# Patient Record
Sex: MALE | Race: WHITE | NOT HISPANIC OR LATINO | Employment: PART TIME | URBAN - METROPOLITAN AREA
[De-identification: names, ages, dates, MRNs, and addresses within clinical notes are randomized per-mention and may not be internally consistent; named-entity substitution may affect disease eponyms.]

---

## 2017-03-24 ENCOUNTER — OFFICE VISIT (OUTPATIENT)
Dept: FAMILY MEDICINE | Facility: CLINIC | Age: 47
End: 2017-03-24
Payer: COMMERCIAL

## 2017-03-24 VITALS
WEIGHT: 288 LBS | TEMPERATURE: 99.7 F | DIASTOLIC BLOOD PRESSURE: 98 MMHG | BODY MASS INDEX: 43.65 KG/M2 | HEIGHT: 68 IN | RESPIRATION RATE: 16 BRPM | HEART RATE: 81 BPM | SYSTOLIC BLOOD PRESSURE: 152 MMHG | OXYGEN SATURATION: 95 %

## 2017-03-24 DIAGNOSIS — R07.0 THROAT PAIN: Primary | ICD-10-CM

## 2017-03-24 DIAGNOSIS — I10 BENIGN ESSENTIAL HYPERTENSION: ICD-10-CM

## 2017-03-24 DIAGNOSIS — Z23 NEED FOR VACCINATION: ICD-10-CM

## 2017-03-24 LAB
DEPRECATED S PYO AG THROAT QL EIA: NORMAL
MICRO REPORT STATUS: NORMAL
SPECIMEN SOURCE: NORMAL

## 2017-03-24 PROCEDURE — 87081 CULTURE SCREEN ONLY: CPT | Performed by: NURSE PRACTITIONER

## 2017-03-24 PROCEDURE — 90715 TDAP VACCINE 7 YRS/> IM: CPT | Performed by: NURSE PRACTITIONER

## 2017-03-24 PROCEDURE — 90471 IMMUNIZATION ADMIN: CPT | Performed by: NURSE PRACTITIONER

## 2017-03-24 PROCEDURE — 87880 STREP A ASSAY W/OPTIC: CPT | Performed by: NURSE PRACTITIONER

## 2017-03-24 PROCEDURE — 99214 OFFICE O/P EST MOD 30 MIN: CPT | Mod: 25 | Performed by: NURSE PRACTITIONER

## 2017-03-24 RX ORDER — CHLORTHALIDONE 25 MG/1
25 TABLET ORAL DAILY
Qty: 30 TABLET | Refills: 3 | Status: SHIPPED | OUTPATIENT
Start: 2017-03-24 | End: 2018-10-01

## 2017-03-24 NOTE — MR AVS SNAPSHOT
After Visit Summary   3/24/2017    Arsenio Stockton    MRN: [id number]           Patient Information     Date Of Birth          1970        Visit Information        Provider Department      3/24/2017 9:40 AM Lata Cabral APRN [facility]        Today's Diagnoses     Throat pain    -  1    Benign essential hypertension          Care Instructions    Start medication for blood pressure daily.  Return in 2 weeks for recheck of pressure, come fasting to that appointment and we can do blood work.  Follow up if symptoms persist or worsen and as needed.                   Upper Respiratory Infection   (Common Cold)   Information About Your Condition:  Description  The common cold is an infection of the head and chest caused by a virus. It is a type of upper respiratory infection (URI). It can affect your nose, throat, sinuses, and ears. A cold can also affect your windpipe, voice box, and airways and the tube that connects your middle ear and throat.  Symptoms  You usually start having cold symptoms one to three days after contact with a cold virus. Symptoms may include one or more of the following:  scratchy or sore throat   sneezing, runny or stuffy nose   cough   watery eyes   ear congestion   slight fever (99 to 100  F, or 37.2 to 37.8  C)   tiredness   headache   loss of appetite.   Causes  Over 200 different viruses can cause colds. The infection spreads when viruses are passed to others by sneezing, coughing, or touching. You may also become infected by handling objects that were touched by someone with a cold.   You are more likely to get a cold if:   You are emotionally or physically stressed.   You are tired.   You are not eating enough healthy food.   You are a smoker.   You are living or working in crowded conditions.   People tend to get fewer colds as they get older because they build up immunity to some of the viruses that can cause colds.   What You Should Do At  Home (Follow-up Care)   There are no medicines that cure a cold. You can treat your symptoms with over-the-counter medicines such as aspirin, acetaminophen, ibuprofen, naproxen, nose drops or sprays, cough syrups and drops, throat lozenges, and decongestants. Check with your provider before you take any of these drugs if you are already taking other medicines.   Get lots of rest.   As long as your healthcare provider has not told you differently, drink plenty of liquids. An average adult should drink at least 6 to 10 eight-ounce glasses of liquids that do not contain alcohol (including beer or wine), such as water, juice, or weak tea each day. One way to tell if you are drinking enough liquid is to look at the color of your urine (pee). It should be very light yellow.   Using cool-mist humidifier to increase air moisture, especially in your bedroom, may make it easier to breathe. Be sure to clean the humidifier often so that it does not grow mold or bacteria.   Use nose drops to relieve nasal congestion. You can buy nose drops or make your own. To make a solution for nose drops, add one teaspoon of salt to a quart of water.   Wash your hands often with soap and warm water for at least 15 seconds to help keep your cold from spreading to others.   Avoid close contact with others for a few days.   Cover your nose and mouth with a tissue when you cough or sneeze. Throw the tissue away in the nearest waste receptacle. If you don t have a tissue, cough into the bend of your elbow.   If you smoke, stop. If someone else in your household smokes, ask them to smoke outside. Avoid exposure to secondhand smoke. Also avoid being outdoors during high-pollution advisories.   Please keep all medicines out of the reach of children.   What You Can Do Stay Healthy  Avoid close contact with people who have a cold.   Keep your hands away from your nose and mouth.   Wash your hands often with warm water and soap for at least 15 seconds,  especially during peak cold and flu season. You can also carry an alcohol-based hand  with you to clean your hands when soap and water aren t available.   Eat healthy foods, especially fruits with vitamin C, such as oranges.   Get 7 to 8 hours of sleep.   Do not smoke and avoid secondhand smoke or being outdoors during high-pollution alerts.   Keep your immunizations up to date. Get a pneumonia vaccine if you have a chronic illness or are 65 years of age or older. Get a flu shot every year in the fall.   Call Your Healthcare Provider Right Away Or Return To The Emergency Department If:  You have trouble breathing not caused by nasal stuffiness.   You are not able to swallow your saliva.   You have a cough that gets worse or becomes painful.   You are coughing up yellowish or greenish phlegm (mucus).   The phlegm you are coughing up has streaks of blood.   You have a temperature of 101.5  F (38.6  C) or higher that lasts more than two days.   You have shaking chills.   You have a headache that lasts several days.   You have bluish, pale, or grayish lips, skin, or nails.   You have any symptoms that worry you.        Thank you for choosing [facility].  You may be receiving a survey in the mail from [employer] regarding your visit today.  Please take a few minutes to complete and return the survey to let us know how we are doing.      Our Clinic hours are:  Mondays    7:20 am - 7 pm  Tues -  Fri  7:20 am - 5 pm    Clinic Phone: [phone]    The clinic lab opens at 7:30 am Mon - Fri and appointments are required.    [facility]. [phone]  Monday-Thursday 8 am - 7pm  Tues/Wed/Fri 8 am - 5:30 pm         Discharge Instructions for High Blood Pressure (Hypertension)  You have been diagnosed with high blood pressure (also called hypertension). This means the force of blood against your artery walls is too strong. It also means your heart is working hard to move blood. High blood  pressure usually has no symptoms, but over time, it can damage your heart, blood vessels, eyes, kidneys, and other organs. With help from your doctor, you can manage your blood pressure and protect your health.  Taking medications    Learn to take your own blood pressure. Keep a record of your results. Ask your doctor which readings mean that you need medical attention.    Take your blood pressure medication exactly as directed. Don t skip doses. Missing doses can cause your blood pressure to get out of control.    Avoid medications that contain heart stimulants, including over-the-counter drugs. Check for warnings about high blood pressure on the label.    Check with your doctor before taking a decongestant. Some decongestants can worsen high blood pressure.  Lifestyle changes    Maintain a healthy weight. Get help to lose any extra pounds.    Cut back on salt.    Limit canned, dried, packaged, and fast foods.    Don t add salt to your food at the table.    Season foods with herbs instead of salt when you cook.    Follow the DASH (Dietary Approaches to Stop Hypertension) eating plan. This plan recommends vegetables, fruits, whole gains, and other heart healthy foods.    Begin an exercise program. Ask your doctor how to get started. The American Heart Association recommends aerobic exercise 3 to 4 times a week for an average of 40 minutes at a time, with your doctor's approval. Simple activities like walking or gardening can help.    Break the smoking habit. Enroll in a stop-smoking program to improve your chances of success. Ask your health care provider about programs and medications to help you stop smoking.    Limit drinks that contain caffeine (coffee, black or green tea, cola) to 2 per day.    Never take stimulants such as amphetamines or cocaine; these drugs can be deadly for someone with high blood pressure.    Control your stress. Learn stress-management techniques.    Limit alcohol to no more than 1 drink  "a day for women and 2 drinks a day for men.  Follow-up care  Make a follow-up appointment as directed by our staff.     When to seek medical care  Call your doctor immediately if you have any of the following:    Chest pain or shortness of breath (call 911)    Moderate to severe headache    Weakness in the muscles of your face, arms, or legs    Trouble speaking    Extreme drowsiness    Confusion    Fainting or dizziness    Pulsating or rushing sound in your ears    Unexplained nosebleed    Weakness, tingling, or numbness of your face, arms, or legs    Change in vision    Blood pressure measured at home that is greater than 180/110     [id number] [employer]. [address] [place] [address]. All rights reserved. This information is not intended as a substitute for professional medical care. Always follow your healthcare professional's instructions.              Follow-ups after your visit        Who to contact     If you have questions or need follow up information about today's clinic visit or your schedule please contact [facility] directly at [phone].  Normal or non-critical lab and imaging results will be communicated to you by MyChart, letter or phone within 4 business days after the clinic has received the results. If you do not hear from us within 7 days, please contact the clinic through [employer] or phone. If you have a critical or abnormal lab result, we will notify you by phone as soon as possible.  Submit refill requests through [employer] or call your pharmacy and they will forward the refill request to us. Please allow 3 business days for your refill to be completed.          Additional Information About Your Visit        MyChart Information     [employer] lets you send messages to your doctor, view your test results, renew your prescriptions, schedule appointments and more. To sign up, go to [email or web address] . Click on \"Log in\" on the left side of the " "screen, which will take you to the Welcome page. Then click on \"Sign up Now\" on the right side of the page.     You will be asked to enter the access code listed below, as well as some personal information. Please follow the directions to create your username and password.     Your access code is: [email or web address]  Expires: 2017 10:11 AM     Your access code will  in 90 days. If you need help or a new code, please call your [facility] or [phone].        Care EveryWhere ID     This is your Care EveryWhere ID. This could be used by other organizations to access your [facility] medical records  [id number]        Your Vitals Were     Pulse Temperature Respirations Height Pulse Oximetry BMI (Body Mass Index)    81 99.7  F (37.6  C) (Oral) 16 5' 8.25\" (1.734 m) 95% 43.47 kg/m2       Blood Pressure from Last 3 Encounters:   17 (!) 152/98   16 (!) 182/102   03/06/15 (!) 130/94    Weight from Last 3 Encounters:   17 288 lb (130.6 kg)   16 285 lb (129.3 kg)   03/06/15 286 lb 12.8 oz (130.1 kg)              We Performed the Following     Beta strep group A culture     Rapid strep screen          Today's Medication Changes          These changes are accurate as of: 3/24/17 10:11 AM.  If you have any questions, ask your nurse or doctor.               Start taking these medicines.        Dose/Directions    chlorthalidone 25 MG tablet   Commonly known as:  HYGROTON   Used for:  Benign essential hypertension   Started by:  Lata Cabral APRN CNP        Dose:  25 mg   Take 1 tablet (25 mg) by mouth daily   Quantity:  30 tablet   Refills:  3            Where to get your medicines      [facility] - [address]  [address] [address]    Hours:  [place] Phone:  [phone]     chlorthalidone 25 MG tablet                Primary Care Provider    Clinic Unassigned Rambo Allison MD       " No address on file        Thank you!     Thank you for choosing [facility]  for your care. Our goal is always to provide you with excellent care. Hearing back from our patients is one way we can continue to improve our services. Please take a few minutes to complete the written survey that you may receive in the mail after your visit with us. Thank you!             Your Updated Medication List - Protect others around you: Learn how to safely use, store and throw away your medicines at www.disposemymeds.org.          This list is accurate as of: 3/24/17 10:11 AM.  Always use your most recent med list.                   Brand Name Dispense Instructions for use    chlorthalidone 25 MG tablet    HYGROTON    30 tablet    Take 1 tablet (25 mg) by mouth daily

## 2017-03-24 NOTE — LETTER
[facility]  [address]  [facility] [address]  [phone]  Dept: [phone]      3/24/2017    Re: Arsenio Stockton      TO WHOM IT MAY CONCERN:    Arsenio Stockton  was seen on 3/24/2017.  Please excuse him  until 3/28/2017 due to illness.    CordRODRICK Acosta CNP  [facility]

## 2017-03-24 NOTE — PROGRESS NOTES
SUBJECTIVE:                                                    Arsenio Stockton is a 46 year old male who presents to clinic today for the following health issues:      ENT Symptoms             Symptoms: cc Present Absent Comment   Fever/Chills  x     Fatigue  x     Muscle Aches  x     Eye Irritation  x     Sneezing  x     Nasal Zoran/Drg  x     Sinus Pressure/Pain  x     Loss of smell   x    Dental pain   x    Sore Throat x x     Swollen Glands  x     Ear Pain/Fullness   x    Cough  x     Wheeze  x     Chest Pain  x     Shortness of breath   x    Rash   x    Other  x  Tunnel vision and dizzy     Symptom duration:  started Wed evening   Symptom severity:  mod   Treatments tried:  last night took Nyquil and Dayquil   Contacts:  work             Problem list and histories reviewed & adjusted, as indicated.  Additional history: states he needs a note for missed work. His son has been sick as well.  He has been told his blood pressure has been elevated in the past.  He has never been treated for that. Denies any obvious symptoms, no palpitations, chest pains, headaches.  His dad had a stroke but he states he was a bad alcoholic. He doesn't know of anyone else in the family with hypertension.  He does chew snuff and drinks caffeine daily. Admits to gaining some weight and not being as active as he should be.      Patient Active Problem List   Diagnosis     CARDIOVASCULAR SCREENING; LDL GOAL LESS THAN 160     Elevated blood pressure reading without diagnosis of hypertension     History reviewed. No pertinent surgical history.    Social History   Substance Use Topics     Smoking status: Former Smoker     Years: 3.00     Types: Dip, chew, snus or snuff     Smokeless tobacco: Former User     Types: Chew     Quit date: 1/8/2014     Alcohol use Yes      Comment: occ     Family History   Problem Relation Age of Onset     Unknown/Adopted Maternal Grandmother      Unknown/Adopted Maternal Grandfather          Current Outpatient  "Prescriptions   Medication Sig Dispense Refill     chlorthalidone (HYGROTON) 25 MG tablet Take 1 tablet (25 mg) by mouth daily 30 tablet 3     No Known Allergies    Reviewed and updated as needed this visit by clinical staff  Tobacco  Allergies  Med Hx  Surg Hx  Fam Hx  Soc Hx      Reviewed and updated as needed this visit by Provider          ROS: 10 point ROS neg other than the symptoms noted above in the HPI.    OBJECTIVE:                                                    BP (!) 152/98  Pulse 81  Temp 99.7  F (37.6  C) (Oral)  Resp 16  Ht 5' 8.25\" (1.734 m)  Wt 288 lb (130.6 kg)  SpO2 95%  BMI 43.47 kg/m2  Body mass index is 43.47 kg/(m^2).  GENERAL: healthy, alert and no distress  HENT: ear canals and TM's normal, pharynx with mild erythema  NECK: no adenopathy, no asymmetry  RESP: lungs clear to auscultation - no rales, rhonchi or wheezes  CV: regular rate and rhythm, normal S1 S2, no S3 or S4, no murmur  ABDOMEN: soft, nontender, no hepatosplenomegaly, no masses and bowel sounds normal  MS: no gross musculoskeletal defects noted      Diagnostic Test Results:  Results for orders placed or performed in visit on 03/24/17 (from the past 24 hour(s))   Rapid strep screen   Result Value Ref Range    Specimen Description Throat     Rapid Strep A Screen       NEGATIVE: No Group A streptococcal antigen detected by immunoassay, await   culture report.      Micro Report Status FINAL 03/24/2017         ASSESSMENT/PLAN:                                                            1. Throat pain    - Rapid strep screen  - Beta strep group A culture    2. Benign essential hypertension    - chlorthalidone (HYGROTON) 25 MG tablet; Take 1 tablet (25 mg) by mouth daily  Dispense: 30 tablet; Refill: 3  Discussed how to take the medication(s), expected outcomes, potential side effects.  Reviewed risks versus benefits of starting medications. Reviewed lifestyle modifications. He is willing to start medications at this " time.Will do labs when he returns for recheck.    3. Need for vaccination    - TDAP VACCINE (ADACEL) [54412.002]  - 1st  Administration  [99193]    See Patient Instructions  Patient Instructions     Start medication for blood pressure daily.  Return in 2 weeks for recheck of pressure, come fasting to that appointment and we can do blood work.  Follow up if symptoms persist or worsen and as needed.                   Upper Respiratory Infection   (Common Cold)   Information About Your Condition:  Description  The common cold is an infection of the head and chest caused by a virus. It is a type of upper respiratory infection (URI). It can affect your nose, throat, sinuses, and ears. A cold can also affect your windpipe, voice box, and airways and the tube that connects your middle ear and throat.  Symptoms  You usually start having cold symptoms one to three days after contact with a cold virus. Symptoms may include one or more of the following:  scratchy or sore throat   sneezing, runny or stuffy nose   cough   watery eyes   ear congestion   slight fever (99 to 100  F, or 37.2 to 37.8  C)   tiredness   headache   loss of appetite.   Causes  Over 200 different viruses can cause colds. The infection spreads when viruses are passed to others by sneezing, coughing, or touching. You may also become infected by handling objects that were touched by someone with a cold.   You are more likely to get a cold if:   You are emotionally or physically stressed.   You are tired.   You are not eating enough healthy food.   You are a smoker.   You are living or working in crowded conditions.   People tend to get fewer colds as they get older because they build up immunity to some of the viruses that can cause colds.   What You Should Do At Home (Follow-up Care)   There are no medicines that cure a cold. You can treat your symptoms with over-the-counter medicines such as aspirin, acetaminophen, ibuprofen, naproxen, nose drops or  sprays, cough syrups and drops, throat lozenges, and decongestants. Check with your provider before you take any of these drugs if you are already taking other medicines.   Get lots of rest.   As long as your healthcare provider has not told you differently, drink plenty of liquids. An average adult should drink at least 6 to 10 eight-ounce glasses of liquids that do not contain alcohol (including beer or wine), such as water, juice, or weak tea each day. One way to tell if you are drinking enough liquid is to look at the color of your urine (pee). It should be very light yellow.   Using cool-mist humidifier to increase air moisture, especially in your bedroom, may make it easier to breathe. Be sure to clean the humidifier often so that it does not grow mold or bacteria.   Use nose drops to relieve nasal congestion. You can buy nose drops or make your own. To make a solution for nose drops, add one teaspoon of salt to a quart of water.   Wash your hands often with soap and warm water for at least 15 seconds to help keep your cold from spreading to others.   Avoid close contact with others for a few days.   Cover your nose and mouth with a tissue when you cough or sneeze. Throw the tissue away in the nearest waste receptacle. If you don t have a tissue, cough into the bend of your elbow.   If you smoke, stop. If someone else in your household smokes, ask them to smoke outside. Avoid exposure to secondhand smoke. Also avoid being outdoors during high-pollution advisories.   Please keep all medicines out of the reach of children.   What You Can Do Stay Healthy  Avoid close contact with people who have a cold.   Keep your hands away from your nose and mouth.   Wash your hands often with warm water and soap for at least 15 seconds, especially during peak cold and flu season. You can also carry an alcohol-based hand  with you to clean your hands when soap and water aren t available.   Eat healthy foods, especially  fruits with vitamin C, such as oranges.   Get 7 to 8 hours of sleep.   Do not smoke and avoid secondhand smoke or being outdoors during high-pollution alerts.   Keep your immunizations up to date. Get a pneumonia vaccine if you have a chronic illness or are 65 years of age or older. Get a flu shot every year in the fall.   Call Your Healthcare Provider Right Away Or Return To The Emergency Department If:  You have trouble breathing not caused by nasal stuffiness.   You are not able to swallow your saliva.   You have a cough that gets worse or becomes painful.   You are coughing up yellowish or greenish phlegm (mucus).   The phlegm you are coughing up has streaks of blood.   You have a temperature of 101.5  F (38.6  C) or higher that lasts more than two days.   You have shaking chills.   You have a headache that lasts several days.   You have bluish, pale, or grayish lips, skin, or nails.   You have any symptoms that worry you.        Thank you for choosing [facility].  You may be receiving a survey in the mail from [employer] regarding your visit today.  Please take a few minutes to complete and return the survey to let us know how we are doing.      Our Clinic hours are:  Mondays    7:20 am - 7 pm  Tues -  Fri  7:20 am - 5 pm    Clinic Phone: [phone]    The clinic lab opens at 7:30 am Mon - Fri and appointments are required.    [facility]. [phone]  Monday-Thursday 8 am - 7pm  Tues/Wed/Fri 8 am - 5:30 pm         Discharge Instructions for High Blood Pressure (Hypertension)  You have been diagnosed with high blood pressure (also called hypertension). This means the force of blood against your artery walls is too strong. It also means your heart is working hard to move blood. High blood pressure usually has no symptoms, but over time, it can damage your heart, blood vessels, eyes, kidneys, and other organs. With help from your doctor, you can manage your blood pressure and  protect your health.  Taking medications    Learn to take your own blood pressure. Keep a record of your results. Ask your doctor which readings mean that you need medical attention.    Take your blood pressure medication exactly as directed. Don t skip doses. Missing doses can cause your blood pressure to get out of control.    Avoid medications that contain heart stimulants, including over-the-counter drugs. Check for warnings about high blood pressure on the label.    Check with your doctor before taking a decongestant. Some decongestants can worsen high blood pressure.  Lifestyle changes    Maintain a healthy weight. Get help to lose any extra pounds.    Cut back on salt.    Limit canned, dried, packaged, and fast foods.    Don t add salt to your food at the table.    Season foods with herbs instead of salt when you cook.    Follow the DASH (Dietary Approaches to Stop Hypertension) eating plan. This plan recommends vegetables, fruits, whole gains, and other heart healthy foods.    Begin an exercise program. Ask your doctor how to get started. The American Heart Association recommends aerobic exercise 3 to 4 times a week for an average of 40 minutes at a time, with your doctor's approval. Simple activities like walking or gardening can help.    Break the smoking habit. Enroll in a stop-smoking program to improve your chances of success. Ask your health care provider about programs and medications to help you stop smoking.    Limit drinks that contain caffeine (coffee, black or green tea, cola) to 2 per day.    Never take stimulants such as amphetamines or cocaine; these drugs can be deadly for someone with high blood pressure.    Control your stress. Learn stress-management techniques.    Limit alcohol to no more than 1 drink a day for women and 2 drinks a day for men.  Follow-up care  Make a follow-up appointment as directed by our staff.     When to seek medical care  Call your doctor immediately if you have  any of the following:    Chest pain or shortness of breath (call 911)    Moderate to severe headache    Weakness in the muscles of your face, arms, or legs    Trouble speaking    Extreme drowsiness    Confusion    Fainting or dizziness    Pulsating or rushing sound in your ears    Unexplained nosebleed    Weakness, tingling, or numbness of your face, arms, or legs    Change in vision    Blood pressure measured at home that is greater than 180/110     6718-9373 [employer] [address] [place] [address]. All rights reserved. This information is not intended as a substitute for professional medical care. Always follow your healthcare professional's instructions.            RODRICK Bustillos [facility]

## 2017-03-24 NOTE — NURSING NOTE
"Chief Complaint   Patient presents with     URI       Initial BP (!) 168/98 (BP Location: Right arm, Patient Position: Chair, Cuff Size: Adult Large)  Pulse 81  Temp 99.7  F (37.6  C) (Oral)  Resp 16  Ht 5' 8.25\" (1.734 m)  Wt 288 lb (130.6 kg)  SpO2 95%  BMI 43.47 kg/m2 Estimated body mass index is 43.47 kg/(m^2) as calculated from the following:    Height as of this encounter: 5' 8.25\" (1.734 m).    Weight as of this encounter: 288 lb (130.6 kg).  Medication Reconciliation: complete  "

## 2017-03-24 NOTE — PATIENT INSTRUCTIONS
Start medication for blood pressure daily.  Return in 2 weeks for recheck of pressure, come fasting to that appointment and we can do blood work.  Follow up if symptoms persist or worsen and as needed.                   Upper Respiratory Infection   (Common Cold)   Information About Your Condition:  Description  The common cold is an infection of the head and chest caused by a virus. It is a type of upper respiratory infection (URI). It can affect your nose, throat, sinuses, and ears. A cold can also affect your windpipe, voice box, and airways and the tube that connects your middle ear and throat.  Symptoms  You usually start having cold symptoms one to three days after contact with a cold virus. Symptoms may include one or more of the following:  scratchy or sore throat   sneezing, runny or stuffy nose   cough   watery eyes   ear congestion   slight fever (99 to 100  F, or 37.2 to 37.8  C)   tiredness   headache   loss of appetite.   Causes  Over 200 different viruses can cause colds. The infection spreads when viruses are passed to others by sneezing, coughing, or touching. You may also become infected by handling objects that were touched by someone with a cold.   You are more likely to get a cold if:   You are emotionally or physically stressed.   You are tired.   You are not eating enough healthy food.   You are a smoker.   You are living or working in crowded conditions.   People tend to get fewer colds as they get older because they build up immunity to some of the viruses that can cause colds.   What You Should Do At Home (Follow-up Care)   There are no medicines that cure a cold. You can treat your symptoms with over-the-counter medicines such as aspirin, acetaminophen, ibuprofen, naproxen, nose drops or sprays, cough syrups and drops, throat lozenges, and decongestants. Check with your provider before you take any of these drugs if you are already taking other medicines.   Get lots of rest.   As long as  your healthcare provider has not told you differently, drink plenty of liquids. An average adult should drink at least 6 to 10 eight-ounce glasses of liquids that do not contain alcohol (including beer or wine), such as water, juice, or weak tea each day. One way to tell if you are drinking enough liquid is to look at the color of your urine (pee). It should be very light yellow.   Using cool-mist humidifier to increase air moisture, especially in your bedroom, may make it easier to breathe. Be sure to clean the humidifier often so that it does not grow mold or bacteria.   Use nose drops to relieve nasal congestion. You can buy nose drops or make your own. To make a solution for nose drops, add one teaspoon of salt to a quart of water.   Wash your hands often with soap and warm water for at least 15 seconds to help keep your cold from spreading to others.   Avoid close contact with others for a few days.   Cover your nose and mouth with a tissue when you cough or sneeze. Throw the tissue away in the nearest waste receptacle. If you don t have a tissue, cough into the bend of your elbow.   If you smoke, stop. If someone else in your household smokes, ask them to smoke outside. Avoid exposure to secondhand smoke. Also avoid being outdoors during high-pollution advisories.   Please keep all medicines out of the reach of children.   What You Can Do Stay Healthy  Avoid close contact with people who have a cold.   Keep your hands away from your nose and mouth.   Wash your hands often with warm water and soap for at least 15 seconds, especially during peak cold and flu season. You can also carry an alcohol-based hand  with you to clean your hands when soap and water aren t available.   Eat healthy foods, especially fruits with vitamin C, such as oranges.   Get 7 to 8 hours of sleep.   Do not smoke and avoid secondhand smoke or being outdoors during high-pollution alerts.   Keep your immunizations up to date. Get a  pneumonia vaccine if you have a chronic illness or are 65 years of age or older. Get a flu shot every year in the fall.   Call Your Healthcare Provider Right Away Or Return To The Emergency Department If:  You have trouble breathing not caused by nasal stuffiness.   You are not able to swallow your saliva.   You have a cough that gets worse or becomes painful.   You are coughing up yellowish or greenish phlegm (mucus).   The phlegm you are coughing up has streaks of blood.   You have a temperature of 101.5  F (38.6  C) or higher that lasts more than two days.   You have shaking chills.   You have a headache that lasts several days.   You have bluish, pale, or grayish lips, skin, or nails.   You have any symptoms that worry you.        Thank you for choosing [facility].  You may be receiving a survey in the mail from [employer] regarding your visit today.  Please take a few minutes to complete and return the survey to let us know how we are doing.      Our Clinic hours are:  Mondays    7:20 am - 7 pm  Tues -  Fri  7:20 am - 5 pm    Clinic Phone: [phone]    The clinic lab opens at 7:30 am Mon - Fri and appointments are required.    [facility]. [phone]  Monday-Thursday 8 am - 7pm  Tues/Wed/Fri 8 am - 5:30 pm         Discharge Instructions for High Blood Pressure (Hypertension)  You have been diagnosed with high blood pressure (also called hypertension). This means the force of blood against your artery walls is too strong. It also means your heart is working hard to move blood. High blood pressure usually has no symptoms, but over time, it can damage your heart, blood vessels, eyes, kidneys, and other organs. With help from your doctor, you can manage your blood pressure and protect your health.  Taking medications    Learn to take your own blood pressure. Keep a record of your results. Ask your doctor which readings mean that you need medical attention.    Take your blood  pressure medication exactly as directed. Don t skip doses. Missing doses can cause your blood pressure to get out of control.    Avoid medications that contain heart stimulants, including over-the-counter drugs. Check for warnings about high blood pressure on the label.    Check with your doctor before taking a decongestant. Some decongestants can worsen high blood pressure.  Lifestyle changes    Maintain a healthy weight. Get help to lose any extra pounds.    Cut back on salt.    Limit canned, dried, packaged, and fast foods.    Don t add salt to your food at the table.    Season foods with herbs instead of salt when you cook.    Follow the DASH (Dietary Approaches to Stop Hypertension) eating plan. This plan recommends vegetables, fruits, whole gains, and other heart healthy foods.    Begin an exercise program. Ask your doctor how to get started. The American Heart Association recommends aerobic exercise 3 to 4 times a week for an average of 40 minutes at a time, with your doctor's approval. Simple activities like walking or gardening can help.    Break the smoking habit. Enroll in a stop-smoking program to improve your chances of success. Ask your health care provider about programs and medications to help you stop smoking.    Limit drinks that contain caffeine (coffee, black or green tea, cola) to 2 per day.    Never take stimulants such as amphetamines or cocaine; these drugs can be deadly for someone with high blood pressure.    Control your stress. Learn stress-management techniques.    Limit alcohol to no more than 1 drink a day for women and 2 drinks a day for men.  Follow-up care  Make a follow-up appointment as directed by our staff.     When to seek medical care  Call your doctor immediately if you have any of the following:    Chest pain or shortness of breath (call 911)    Moderate to severe headache    Weakness in the muscles of your face, arms, or legs    Trouble speaking    Extreme  drowsiness    Confusion    Fainting or dizziness    Pulsating or rushing sound in your ears    Unexplained nosebleed    Weakness, tingling, or numbness of your face, arms, or legs    Change in vision    Blood pressure measured at home that is greater than 180/110     8747-7983 [employer]. [address] [place] [address]. All rights reserved. This information is not intended as a substitute for professional medical care. Always follow your healthcare professional's instructions.

## 2017-03-24 NOTE — LETTER
[facility]  [address]  [facility] [address]  Phone: [phone]    March 28, 2017    Arsenio Mahin  [address] [address] [address]              Dear Mr. Stockton,      The results of your 24 hour throat culture were negative. Please contact your clinic if you have any questions or concerns.              Sincerely,      Lata Cabral NP/ [facility] [place]

## 2017-03-26 LAB
BACTERIA SPEC CULT: NORMAL
MICRO REPORT STATUS: NORMAL
SPECIMEN SOURCE: NORMAL

## 2017-03-27 PROBLEM — I10 BENIGN ESSENTIAL HYPERTENSION: Status: ACTIVE | Noted: 2017-03-27

## 2017-04-10 ENCOUNTER — OFFICE VISIT (OUTPATIENT)
Dept: FAMILY MEDICINE | Facility: CLINIC | Age: 47
End: 2017-04-10
Payer: COMMERCIAL

## 2017-04-10 VITALS
SYSTOLIC BLOOD PRESSURE: 130 MMHG | BODY MASS INDEX: 43.65 KG/M2 | HEART RATE: 85 BPM | WEIGHT: 288 LBS | TEMPERATURE: 98.4 F | RESPIRATION RATE: 16 BRPM | HEIGHT: 68 IN | DIASTOLIC BLOOD PRESSURE: 89 MMHG

## 2017-04-10 DIAGNOSIS — I10 BENIGN ESSENTIAL HYPERTENSION: Primary | ICD-10-CM

## 2017-04-10 DIAGNOSIS — J31.2 CHRONIC PHARYNGITIS: ICD-10-CM

## 2017-04-10 LAB
BASOPHILS # BLD AUTO: 0 10E9/L (ref 0–0.2)
BASOPHILS NFR BLD AUTO: 0.4 %
DIFFERENTIAL METHOD BLD: NORMAL
EOSINOPHIL # BLD AUTO: 0.2 10E9/L (ref 0–0.7)
EOSINOPHIL NFR BLD AUTO: 2.5 %
ERYTHROCYTE [DISTWIDTH] IN BLOOD BY AUTOMATED COUNT: 14.5 % (ref 10–15)
HCT VFR BLD AUTO: 43.9 % (ref 40–53)
HGB BLD-MCNC: 15.5 G/DL (ref 13.3–17.7)
LYMPHOCYTES # BLD AUTO: 2.9 10E9/L (ref 0.8–5.3)
LYMPHOCYTES NFR BLD AUTO: 30.7 %
MCH RBC QN AUTO: 28.7 PG (ref 26.5–33)
MCHC RBC AUTO-ENTMCNC: 35.3 G/DL (ref 31.5–36.5)
MCV RBC AUTO: 81 FL (ref 78–100)
MONOCYTES # BLD AUTO: 0.8 10E9/L (ref 0–1.3)
MONOCYTES NFR BLD AUTO: 8.4 %
NEUTROPHILS # BLD AUTO: 5.4 10E9/L (ref 1.6–8.3)
NEUTROPHILS NFR BLD AUTO: 58 %
PLATELET # BLD AUTO: 243 10E9/L (ref 150–450)
RBC # BLD AUTO: 5.41 10E12/L (ref 4.4–5.9)
WBC # BLD AUTO: 9.3 10E9/L (ref 4–11)

## 2017-04-10 PROCEDURE — 85025 COMPLETE CBC W/AUTO DIFF WBC: CPT | Performed by: NURSE PRACTITIONER

## 2017-04-10 PROCEDURE — 84443 ASSAY THYROID STIM HORMONE: CPT | Performed by: NURSE PRACTITIONER

## 2017-04-10 PROCEDURE — 84439 ASSAY OF FREE THYROXINE: CPT | Performed by: NURSE PRACTITIONER

## 2017-04-10 PROCEDURE — 36415 COLL VENOUS BLD VENIPUNCTURE: CPT | Performed by: NURSE PRACTITIONER

## 2017-04-10 PROCEDURE — 80053 COMPREHEN METABOLIC PANEL: CPT | Performed by: NURSE PRACTITIONER

## 2017-04-10 PROCEDURE — 99214 OFFICE O/P EST MOD 30 MIN: CPT | Performed by: NURSE PRACTITIONER

## 2017-04-10 RX ORDER — CETIRIZINE HYDROCHLORIDE 10 MG/1
10 TABLET ORAL DAILY
COMMUNITY
End: 2018-10-05

## 2017-04-10 NOTE — PROGRESS NOTES
"  SUBJECTIVE:                                                    Arsenio Stockton is a 46 year old male who presents to clinic today for the following health issues:      Hypertension Follow-up      Outpatient blood pressures are not being checked.    Low Salt Diet: not monitoring salt       Amount of exercise or physical activity: None    Problems taking medications regularly: No    Medication side effects: none    Diet: regular (no restrictions)          Problem list and histories reviewed & adjusted, as indicated.  Additional history: states he has no side effects from medication and is feeling fine. Blood pressure is down today.  He states he still has a sore throat or feeling like there's something in the right side of throat. He's on zyrtec daily and has been taking that for an itchy rash.  He's also been on Prilosec daily for heart burn issues, that's been fine. No other specific URI complaint.   He states he wakes at night gasping for air at times. His brother had a sleep study and reports feeling \"so much better\" after being treated for sleep apnea.  He's willing to do that at this time.  He has tried to decrease energy drinks. Continues to struggle with weight.    Patient Active Problem List   Diagnosis     CARDIOVASCULAR SCREENING; LDL GOAL LESS THAN 160     Elevated blood pressure reading without diagnosis of hypertension     Benign essential hypertension     History reviewed. No pertinent surgical history.    Social History   Substance Use Topics     Smoking status: Former Smoker     Years: 3.00     Types: Dip, chew, snus or snuff     Smokeless tobacco: Former User     Types: Chew     Quit date: 1/8/2014     Alcohol use Yes      Comment: occ     Family History   Problem Relation Age of Onset     Unknown/Adopted Maternal Grandmother      Unknown/Adopted Maternal Grandfather          Current Outpatient Prescriptions   Medication Sig Dispense Refill     OMEPRAZOLE PO Take 20 mg by mouth every morning       " "cetirizine (ZYRTEC) 10 MG tablet Take 10 mg by mouth daily       chlorthalidone (HYGROTON) 25 MG tablet Take 1 tablet (25 mg) by mouth daily 30 tablet 3     No Known Allergies  BP Readings from Last 3 Encounters:   04/10/17 130/89   03/24/17 (!) 152/98   02/29/16 (!) 182/102    Wt Readings from Last 3 Encounters:   04/10/17 288 lb (130.6 kg)   03/24/17 288 lb (130.6 kg)   02/29/16 285 lb (129.3 kg)                    Reviewed and updated as needed this visit by clinical staff  Tobacco  Allergies  Med Hx  Surg Hx  Fam Hx  Soc Hx      Reviewed and updated as needed this visit by Provider          ROS: 10 point ROS neg other than the symptoms noted above in the HPI.    OBJECTIVE:                                                    /89  Pulse 85  Temp 98.4  F (36.9  C) (Oral)  Resp 16  Ht 5' 8.25\" (1.734 m)  Wt 288 lb (130.6 kg)  BMI 43.47 kg/m2  Body mass index is 43.47 kg/(m^2).  GENERAL: healthy, alert and no distress  HENT: ear canals and TM's normal, pharynx without erythema  NECK: no adenopathy, no asymmetry  RESP: lungs clear to auscultation - no rales, rhonchi or wheezes  CV: regular rate and rhythm, normal S1 S2, no S3 or S4, no murmur  ABDOMEN: soft, obese, nontender  MS: no gross musculoskeletal defects noted      Diagnostic Test Results:  Results for orders placed or performed in visit on 04/10/17 (from the past 24 hour(s))   CBC with platelets differential   Result Value Ref Range    WBC 9.3 4.0 - 11.0 10e9/L    RBC Count 5.41 4.4 - 5.9 10e12/L    Hemoglobin 15.5 13.3 - 17.7 g/dL    Hematocrit 43.9 40.0 - 53.0 %    MCV 81 78 - 100 fl    MCH 28.7 26.5 - 33.0 pg    MCHC 35.3 31.5 - 36.5 g/dL    RDW 14.5 10.0 - 15.0 %    Platelet Count 243 150 - 450 10e9/L    Diff Method Automated Method     % Neutrophils 58.0 %    % Lymphocytes 30.7 %    % Monocytes 8.4 %    % Eosinophils 2.5 %    % Basophils 0.4 %    Absolute Neutrophil 5.4 1.6 - 8.3 10e9/L    Absolute Lymphocytes 2.9 0.8 - 5.3 10e9/L    " Absolute Monocytes 0.8 0.0 - 1.3 10e9/L    Absolute Eosinophils 0.2 0.0 - 0.7 10e9/L    Absolute Basophils 0.0 0.0 - 0.2 10e9/L        ASSESSMENT/PLAN:                                                            1. Benign essential hypertension    - Comprehensive metabolic panel  - TSH with free T4 reflex  - SLEEP EVALUATION & MANAGEMENT REFERRAL - ADULT; Future    2. Chronic pharyngitis    - CBC with platelets differential    See Patient Instructions  Patient Instructions   Call to schedule with sleep medicine.  Continue same medication for blood pressure. Check readings outside of clinic with goal of consistently <140/90.  Stop decongestants.  May want to increase Prilosec to twice a day for a week or so and see if that will calm down your sore throat. If it doesn't then you may want to increase the Zyrtec to twice a day.  If no help with either of that, will send to ENT for further evaluation.  Will let you know results of blood work done today.  Follow up if symptoms persist or worsen and as needed.        RODRICK Bustillos [facility]

## 2017-04-10 NOTE — NURSING NOTE
"Chief Complaint   Patient presents with     Hypertension       Initial /87 (BP Location: Right arm, Patient Position: Chair, Cuff Size: Adult Large)  Pulse 85  Temp 98.4  F (36.9  C) (Oral)  Resp 16  Ht 5' 8.25\" (1.734 m)  Wt 288 lb (130.6 kg)  BMI 43.47 kg/m2 Estimated body mass index is 43.47 kg/(m^2) as calculated from the following:    Height as of this encounter: 5' 8.25\" (1.734 m).    Weight as of this encounter: 288 lb (130.6 kg).  Medication Reconciliation: complete  "

## 2017-04-10 NOTE — LETTER
[facility]  [address]  [place] [address]  Phone: [phone]      4/11/2017     Arsenio Stockton  [address] [place] [address]      Dear Arsenio:    Thank you for allowing me to participate in your care. Your recent test results were reviewed and listed below.      Your results are provided below for your review  Results for orders placed or performed in visit on 04/10/17   Comprehensive metabolic panel   Result Value Ref Range    Sodium 141 133 - 144 mmol/L    Potassium 3.1 (L) 3.4 - 5.3 mmol/L    Chloride 101 94 - 109 mmol/L    Carbon Dioxide 29 20 - 32 mmol/L    Anion Gap 11 3 - 14 mmol/L    Glucose 124 (H) 70 - 99 mg/dL    Urea Nitrogen 20 7 - 30 mg/dL    Creatinine 0.95 0.66 - 1.25 mg/dL    GFR Estimate 86 >60 mL/min/1.7m2    GFR Estimate If Black >90   GFR Calc   >60 mL/min/1.7m2    Calcium 8.5 8.5 - 10.1 mg/dL    Bilirubin Total 0.3 0.2 - 1.3 mg/dL    Albumin 3.8 3.4 - 5.0 g/dL    Protein Total 7.7 6.8 - 8.8 g/dL    Alkaline Phosphatase 90 40 - 150 U/L    ALT 46 0 - 70 U/L    AST 26 0 - 45 U/L   CBC with platelets differential   Result Value Ref Range    WBC 9.3 4.0 - 11.0 10e9/L    RBC Count 5.41 4.4 - 5.9 10e12/L    Hemoglobin 15.5 13.3 - 17.7 g/dL    Hematocrit 43.9 40.0 - 53.0 %    MCV 81 78 - 100 fl    MCH 28.7 26.5 - 33.0 pg    MCHC 35.3 31.5 - 36.5 g/dL    RDW 14.5 10.0 - 15.0 %    Platelet Count 243 150 - 450 10e9/L    Diff Method Automated Method     % Neutrophils 58.0 %    % Lymphocytes 30.7 %    % Monocytes 8.4 %    % Eosinophils 2.5 %    % Basophils 0.4 %    Absolute Neutrophil 5.4 1.6 - 8.3 10e9/L    Absolute Lymphocytes 2.9 0.8 - 5.3 10e9/L    Absolute Monocytes 0.8 0.0 - 1.3 10e9/L    Absolute Eosinophils 0.2 0.0 - 0.7 10e9/L    Absolute Basophils 0.0 0.0 - 0.2 10e9/L   TSH with free T4 reflex   Result Value Ref Range    TSH 0.39 (L) 0.40 - 4.00 mU/L   T4 free   Result Value Ref Range    T4 Free 0.94 0.76 - 1.46 ng/dL     Thyroid test  was low normal.  Your potassium was low. I want you to add potassium rich foods to your diet daily, such as bananas, White Beans, Coconut water (no sugar added), Pomegranate seeds and juice, Dried apricots, Wild caugh salmon, sweet potatoes, cooked spinach, Guy[place] squash and avocados.  Elevated blood sugar level.  I would like to repeat labs in 1 month to reassess your potassium and blood sugar. (basic and a1c, lab only). We can repeat thyroid in 3 months, I'd like to see you then to recheck blood pressure and discuss sleep study as hopefully you'll have that done within 3 months, any questions, let us know.  Blood counts were normal. Kidney and liver tests were normal.            Thank you for choosing [facility]. As a result, please continue with the treatment plan discussed in the office. Return as discussed or sooner if symptoms worsen or fail to improve. If you have any further questions or concerns, please do not hesitate to contact us.      Sincerely,        CANDY Mckenzie

## 2017-04-10 NOTE — PATIENT INSTRUCTIONS
Call to schedule with sleep medicine.  Continue same medication for blood pressure. Check readings outside of clinic with goal of consistently <140/90.  Stop decongestants.  May want to increase Prilosec to twice a day for a week or so and see if that will calm down your sore throat. If it doesn't then you may want to increase the Zyrtec to twice a day.  If no help with either of that, will send to ENT for further evaluation.  Will let you know results of blood work done today.  Follow up if symptoms persist or worsen and as needed.

## 2017-04-10 NOTE — MR AVS SNAPSHOT
After Visit Summary   4/10/2017    Arsenio Stockton    MRN: [id number]           Patient Information     Date Of Birth          1970        Visit Information        Provider Department      4/10/2017 2:00 PM Lata Cabral APRN CNP [facility]        Today's Diagnoses     Benign essential hypertension    -  1    Chronic pharyngitis          Care Instructions    Call to schedule with sleep medicine.  Continue same medication for blood pressure. Check readings outside of clinic with goal of consistently <140/90.  Stop decongestants.  May want to increase Prilosec to twice a day for a week or so and see if that will calm down your sore throat. If it doesn't then you may want to increase the Zyrtec to twice a day.  If no help with either of that, will send to ENT for further evaluation.  Will let you know results of blood work done today.  Follow up if symptoms persist or worsen and as needed.          Follow-ups after your visit        Additional Services     SLEEP EVALUATION & MANAGEMENT REFERRAL - ADULT       Please be aware that coverage of these services is subject to the terms and limitations of your health insurance plan.  Call member services at your health plan with any benefit or coverage questions.      Please bring the following to your appointment:    >>   List of current medications   >>   This referral request   >>   Any documents/labs given to you for this referral    [facility] / Lab  Ph [phone] (Age 2 and up)                  Future tests that were ordered for you today     Open Future Orders        Priority Expected Expires Ordered    SLEEP EVALUATION & MANAGEMENT REFERRAL - ADULT Routine  4/10/2018 4/10/2017            Who to contact     If you have questions or need follow up information about today's clinic visit or your schedule please contact [facility] directly at [phone].  Normal or non-critical lab and  "imaging results will be communicated to you by MyChart, letter or phone within 4 business days after the clinic has received the results. If you do not hear from us within 7 days, please contact the clinic through [employer] or phone. If you have a critical or abnormal lab result, we will notify you by phone as soon as possible.  Submit refill requests through [employer] or call your pharmacy and they will forward the refill request to us. Please allow 3 business days for your refill to be completed.          Additional Information About Your Visit        Entredaharclipkit Information     Vatler lets you send messages to your doctor, view your test results, renew your prescriptions, schedule appointments and more. To sign up, go to [email or web address] . Click on \"Log in\" on the left side of the screen, which will take you to the Welcome page. Then click on \"Sign up Now\" on the right side of the page.     You will be asked to enter the access code listed below, as well as some personal information. Please follow the directions to create your username and password.     Your access code is: [email or web address]  Expires: 2017 10:11 AM     Your access code will  in 90 days. If you need help or a new code, please call your [facility] clinic or [phone].        Care EveryWhere ID     This is your Care EveryWhere ID. This could be used by other organizations to access your [facility] medical records  [id number]        Your Vitals Were     Pulse Temperature Respirations Height BMI (Body Mass Index)       85 98.4  F (36.9  C) (Oral) 16 5' 8.25\" (1.734 m) 43.47 kg/m2        Blood Pressure from Last 3 Encounters:   04/10/17 130/89   17 (!) 152/98   16 (!) 182/102    Weight from Last 3 Encounters:   04/10/17 288 lb (130.6 kg)   17 288 lb (130.6 kg)   16 285 lb (129.3 kg)              We Performed the Following     CBC with platelets differential     Comprehensive metabolic panel     TSH with free T4 reflex     "    Primary Care Provider Office Phone # Fax #    RODRICK Bustillos [phone]       [facility] [address]  [facility] [address]        Thank you!     Thank you for choosing [facility]  for your care. Our goal is always to provide you with excellent care. Hearing back from our patients is one way we can continue to improve our services. Please take a few minutes to complete the written survey that you may receive in the mail after your visit with us. Thank you!             Your Updated Medication List - Protect others around you: Learn how to safely use, store and throw away your medicines at www.disposemymeds.org.          This list is accurate as of: 4/10/17  2:35 PM.  Always use your most recent med list.                   Brand Name Dispense Instructions for use    cetirizine 10 MG tablet    zyrTEC     Take 10 mg by mouth daily       chlorthalidone 25 MG tablet    HYGROTON    30 tablet    Take 1 tablet (25 mg) by mouth daily       OMEPRAZOLE PO      Take 20 mg by mouth every morning

## 2017-04-11 LAB
ALBUMIN SERPL-MCNC: 3.8 G/DL (ref 3.4–5)
ALP SERPL-CCNC: 90 U/L (ref 40–150)
ALT SERPL W P-5'-P-CCNC: 46 U/L (ref 0–70)
ANION GAP SERPL CALCULATED.3IONS-SCNC: 11 MMOL/L (ref 3–14)
AST SERPL W P-5'-P-CCNC: 26 U/L (ref 0–45)
BILIRUB SERPL-MCNC: 0.3 MG/DL (ref 0.2–1.3)
BUN SERPL-MCNC: 20 MG/DL (ref 7–30)
CALCIUM SERPL-MCNC: 8.5 MG/DL (ref 8.5–10.1)
CHLORIDE SERPL-SCNC: 101 MMOL/L (ref 94–109)
CO2 SERPL-SCNC: 29 MMOL/L (ref 20–32)
CREAT SERPL-MCNC: 0.95 MG/DL (ref 0.66–1.25)
GFR SERPL CREATININE-BSD FRML MDRD: 86 ML/MIN/1.7M2
GLUCOSE SERPL-MCNC: 124 MG/DL (ref 70–99)
POTASSIUM SERPL-SCNC: 3.1 MMOL/L (ref 3.4–5.3)
PROT SERPL-MCNC: 7.7 G/DL (ref 6.8–8.8)
SODIUM SERPL-SCNC: 141 MMOL/L (ref 133–144)
T4 FREE SERPL-MCNC: 0.94 NG/DL (ref 0.76–1.46)
TSH SERPL DL<=0.005 MIU/L-ACNC: 0.39 MU/L (ref 0.4–4)

## 2017-05-08 ENCOUNTER — TELEPHONE (OUTPATIENT)
Dept: SLEEP MEDICINE | Facility: CLINIC | Age: 47
End: 2017-05-08

## 2017-05-08 ENCOUNTER — OFFICE VISIT (OUTPATIENT)
Dept: SLEEP MEDICINE | Facility: CLINIC | Age: 47
End: 2017-05-08
Payer: COMMERCIAL

## 2017-05-08 VITALS
SYSTOLIC BLOOD PRESSURE: 139 MMHG | BODY MASS INDEX: 42.59 KG/M2 | HEART RATE: 74 BPM | OXYGEN SATURATION: 95 % | HEIGHT: 68 IN | DIASTOLIC BLOOD PRESSURE: 83 MMHG | WEIGHT: 281 LBS

## 2017-05-08 DIAGNOSIS — I10 ESSENTIAL HYPERTENSION: ICD-10-CM

## 2017-05-08 DIAGNOSIS — R06.83 SNORING: Primary | ICD-10-CM

## 2017-05-08 DIAGNOSIS — E66.01 MORBID OBESITY DUE TO EXCESS CALORIES (H): ICD-10-CM

## 2017-05-08 DIAGNOSIS — R53.82 CHRONIC FATIGUE: ICD-10-CM

## 2017-05-08 PROCEDURE — 99215 OFFICE O/P EST HI 40 MIN: CPT | Performed by: FAMILY MEDICINE

## 2017-05-08 NOTE — PROGRESS NOTES
"  Sleep Consultation:    Date on this visit: 5/8/2017    Arsenio Stockton is sent by No ref. provider found for a sleep consultation regarding probable DEIRDRE.    Primary Physician: Lata Cabral     CC: \"I can wake up gasping for air and am always tired.\"    HPI:  Arsenio Stockton is a pleasant 45 yo M wit history of obesity, HTN who presents for high likelihood of sleep disordered breathing.  He is alone for this visit today.    He notes a 3+ year history of loud snoring, infrequent waking up with a gasping sensation, daytime fatigue.  He became motivated to come in after his brother was diagnosed with DEIRDRE, treated with CPAP and has felt much better.    On work days, to bed around 8pm, asleep in ~30 minutes, will awaken 2-3x to use BR / gasp for air, may take ~15 minutes to return to sleep.  Up by alarm at 3:45am.  Weekends is 11pm - 7am.    Arsenio denies any sleep walking, dream enactment and sleep paralysis.    Patient's Sparks Sleepiness score 12/24 consistent with excessive  daytime sleepiness.      SHx:  , two children.  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    Allergies:    No Known Allergies    Medications:    Current Outpatient Prescriptions   Medication Sig Dispense Refill     POTASSIUM CITRATE PO        OMEPRAZOLE PO Take 20 mg by mouth every morning       cetirizine (ZYRTEC) 10 MG tablet Take 10 mg by mouth daily       chlorthalidone (HYGROTON) 25 MG tablet Take 1 tablet (25 mg) by mouth daily 30 tablet 3       Problem List:  Patient Active Problem List    Diagnosis Date Noted     Benign essential hypertension 03/27/2017     Priority: Medium     CARDIOVASCULAR SCREENING; LDL GOAL LESS THAN 160 02/06/2014     Priority: Medium     Elevated blood pressure reading without diagnosis of hypertension 03/06/2015        Past Medical/Surgical History:  No past medical history on file.  No past surgical history on file.    Social History:  Social History     Social History     " Marital status:      Spouse name: N/A     Number of children: N/A     Years of education: N/A     Occupational History      [employer]     Social History Main Topics     Smoking status: Former Smoker     Years: 3.00     Types: Dip, chew, snus or snuff     Smokeless tobacco: Former User     Types: Chew     Quit date: 1/8/2014     Alcohol use Yes      Comment: occ     Drug use: No     Sexual activity: Yes     Partners: Female     Other Topics Concern     Parent/Sibling W/ Cabg, Mi Or Angioplasty Before 65f 55m? No     Social History Narrative       Family History:  Family History   Problem Relation Age of Onset     Unknown/Adopted Maternal Grandmother      Unknown/Adopted Maternal Grandfather        Review of Systems:  A complete review of systems reviewed by me is negative with the exeption of what has been mentioned in the history of present illness.  CONSTITUTIONAL:  POSITIVE for  weight gain, sweats and night sweats  EYES:  POSITIVE for changes in vision and double vision  ENT:  POSITIVE for  ear pain, sore throat, sinus pain and runny nose  CARDIAC:  POSITIVE for  fast heart beats and hypertension  NEUROLOGIC:  POSITIVE for  headaches and weakness or numbness in the arms or legs  DERMATOLOGIC:  POSITIVE for  new mole(s)  PULMONARY:  POSITIVE for  SOB with activity and productive cough  GASTROINTESTINAL: NEGATIVE for nausea or vomitting, loose or watery stools, fat or grease in stools, constipation, abdominal pain, bowel movements black in color or blood noted.  GENITOURINARY: NEGATIVE for pain during urination, blood in urine, urinating more frequently than usual, irregular menstrual periods.  MUSCULOSKELETAL:  POSITIVE for  muscle pain and bone or joint pain  ENDOCRINE: NEGATIVE for increased thirst or urination, diabetes.  LYMPHATIC: NEGATIVE for swollen lymph nodes, lumps or bumps in the breasts or nipple discharge.    Physical Examination:  Vitals: /83  Pulse 74  Ht 1.734 m (5'  "8.25\")  Wt 127.5 kg (281 lb)  SpO2 95%  BMI 42.41 kg/m2  BMI= Body mass index is 42.41 kg/(m^2).    Neck Cir (cm): 46 cm    No flowsheet data found.    GENERAL APPEARANCE: healthy, alert, no distress and over weight  RESP: lungs clear to auscultation - no rales, rhonchi or wheezes  CV: regular rates and rhythm, normal S1 S2, no S3 or S4 and no murmur, click or rub  PSYCH: mentation appears normal and affect normal/bright    Impression/Plan:    Arsenio Stockton is a pleasant 47 yo M wit history of obesity, HTN who presents for high likelihood of sleep disordered breathing.    1.)  High probability of DEIRDRE.     - STOP-BANG 6+/8, missing only age > 50 and observed apnea.  Family history of DEIRDRE in brother, recently diagnosed.   - Will schedule home sleep study to fully evaluate for DEIRDRE and possible treatment.  Will see patient after the study.    Plan as given to patient as above.    I have spent 60 minutes with this patient today in which greater than 50% of this time was spent in the counseling / coordination of care regarding DEIRDRE.    Polysomnography reviewed.  Obstructive sleep apnea reviewed.  Complications of untreated sleep apnea were reviewed.    Devon Rodriguez MD    CC: No ref. provider found        "

## 2017-05-08 NOTE — MR AVS SNAPSHOT
After Visit Summary   5/8/2017    Arsenio Stockton    MRN: [id number]           Patient Information     Date Of Birth          1970        Visit Information        Provider Department      5/8/2017 11:00 AM Devon Rodriguez MD [facility]        Today's Diagnoses     Snoring    -  1    Morbid obesity due to excess calories (H)        Chronic fatigue        Essential hypertension          Care Instructions      Your BMI is Body mass index is 42.41 kg/(m^2).  Weight management is a personal decision.  If you are interested in exploring weight loss strategies, the following discussion covers the approaches that may be successful. Body mass index (BMI) is one way to tell whether you are at a healthy weight, overweight, or obese. It measures your weight in relation to your height.  A BMI of 18.5 to 24.9 is in the healthy range. A person with a BMI of 25 to 29.9 is considered overweight, and someone with a BMI of 30 or greater is considered obese. More than two-thirds of American adults are considered overweight or obese.  Being overweight or obese increases the risk for further weight gain. Excess weight may lead to heart disease and diabetes.  Creating and following plans for healthy eating and physical activity may help you improve your health.  Weight control is part of healthy lifestyle and includes exercise, emotional health, and healthy eating habits. Careful eating habits lifelong are the mainstay of weight control. Though there are significant health benefits from weight loss, long-term weight loss with diet alone may be very difficult to achieve- studies show long-term success with dietary management in less than 10% of people. Attaining a healthy weight may be especially difficult to achieve in those with severe obesity. In some cases, medications, devices and surgical management might be considered.  What can you do?  If you are overweight or obese and are interested  in methods for weight loss, you should discuss this with your provider.     Consider reducing daily calorie intake by 500 calories.     Keep a food journal.     Avoiding skipping meals, consider cutting portions instead.    Diet combined with exercise helps maintain muscle while optimizing fat loss. Strength training is particularly important for building and maintaining muscle mass. Exercise helps reduce stress, increase energy, and improves fitness. Increasing exercise without diet control, however, may not burn enough calories to loose weight.       Start walking three days a week 10-20 minutes at a time    Work towards walking thirty minutes five days a week     Eventually, increase the speed of your walking for 1-2 minutes at time    In addition, we recommend that you review healthy lifestyles and methods for weight loss available through the National Institutes of Health patient information sites:  http://win.niddk.nih.gov/publications/index.htm    And look into health and wellness programs that may be available through your health insurance provider, employer, local community center, or bijal club.    Weight management plan: Patient was referred to their PCP to discuss a diet and exercise plan.          Follow-ups after your visit        Follow-up notes from your care team     Return if symptoms worsen or fail to improve.      Your next 10 appointments already scheduled     May 23, 2017  4:00 PM CDT   [facility]  with SLEEP LAB[facility] BED [facility]   [facility]    [address]  [facility] [address]   [phone]            May 24, 2017  9:00 AM CDT   HST Drop Off with SLEEP LAB, BED FIVE   [facility]    [address]  [facility] [address]   [phone]              Future tests that were ordered for you today     Open Future Orders        Priority Expected Expires Ordered    HST-Home Sleep Apnea Test  "Routine  2017            Who to contact     If you have questions or need follow up information about today's clinic visit or your schedule please contact [facility] directly at [phone].  Normal or non-critical lab and imaging results will be communicated to you by MyChart, letter or phone within 4 business days after the clinic has received the results. If you do not hear from us within 7 days, please contact the clinic through MyChart or phone. If you have a critical or abnormal lab result, we will notify you by phone as soon as possible.  Submit refill requests through [employer] or call your pharmacy and they will forward the refill request to us. Please allow 3 business days for your refill to be completed.          Additional Information About Your Visit        MyMosahart Information     [employer] lets you send messages to your doctor, view your test results, renew your prescriptions, schedule appointments and more. To sign up, go to [email or web address] . Click on \"Log in\" on the left side of the screen, which will take you to the Welcome page. Then click on \"Sign up Now\" on the right side of the page.     You will be asked to enter the access code listed below, as well as some personal information. Please follow the directions to create your username and password.     Your access code is: [email or web address]  Expires: 2017 10:11 AM     Your access code will  in 90 days. If you need help or a new code, please call your [facility] or [phone].        Care EveryWhere ID     This is your Care EveryWhere ID. This could be used by other organizations to access your [facility] medical records  [id number]        Your Vitals Were     Pulse Height Pulse Oximetry BMI (Body Mass Index)          74 1.734 m (5' 8.25\") 95% 42.41 kg/m2         Blood Pressure from Last 3 Encounters:   17 139/83   04/10/17 130/89   17 (!) 152/98    Weight from Last 3 Encounters: "   05/08/17 127.5 kg (281 lb)   04/10/17 130.6 kg (288 lb)   03/24/17 130.6 kg (288 lb)               Primary Care Provider Office Phone # Fax #    Lata Cabral RODRICK [phone]       [facility] [address]  [facility] [address]        Thank you!     Thank you for choosing [facility]  for your care. Our goal is always to provide you with excellent care. Hearing back from our patients is one way we can continue to improve our services. Please take a few minutes to complete the written survey that you may receive in the mail after your visit with us. Thank you!             Your Updated Medication List - Protect others around you: Learn how to safely use, store and throw away your medicines at www.disposemymeds.org.          This list is accurate as of: 5/8/17  2:23 PM.  Always use your most recent med list.                   Brand Name Dispense Instructions for use    cetirizine 10 MG tablet    zyrTEC     Take 10 mg by mouth daily       chlorthalidone 25 MG tablet    HYGROTON    30 tablet    Take 1 tablet (25 mg) by mouth daily       OMEPRAZOLE PO      Take 20 mg by mouth every morning       POTASSIUM CITRATE PO

## 2017-05-08 NOTE — NURSING NOTE
"Chief Complaint   Patient presents with     Sleep Problem     Consult Lata Cabral CNP, snoring, witnessed apneas, gasping, choking       Initial /83  Pulse 74  Ht 1.734 m (5' 8.25\")  Wt 127.5 kg (281 lb)  SpO2 95%  BMI 42.41 kg/m2 Estimated body mass index is 42.41 kg/(m^2) as calculated from the following:    Height as of this encounter: 1.734 m (5' 8.25\").    Weight as of this encounter: 127.5 kg (281 lb).  Medication Reconciliation: complete    "

## 2017-05-08 NOTE — PATIENT INSTRUCTIONS

## 2017-05-08 NOTE — PROCEDURES
"CC: \"sleep is not great\"     HPI:   Arsenio Stockton is a 46 year old male with past medical history of hypertension who presents today for concerns of waking up gasping for air and snoring. Patient reports that he comes in today after his brother was recently diagnosed with DEIRDRE. He states that he has always snored and has had episodes where he wakes up gasping for air. He states that he has 1-3 nightly awakenings gasping for air and will occasionally wake himself up from sleep due to snoring. Typical bedtime routine is to go to bed around 2000 and watch TV for approximately 30 minutes before falling asleep. He then sleeps through the night with 1-3 awakenings, but is able to fall back to sleep in ~15 minutes. He will then sleep until his alarm goes off at 0345. Patient denies any hx of sleep walking/talking, falling out of bed, acting out dreams, or witnessed apnea events by his wife.     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

## 2017-05-08 NOTE — TELEPHONE ENCOUNTER
Reminder to call patient with HST results, ok to leave result information on voicemail as patient works 10-12 hour days and never has a specific lunch/break schedule.

## 2017-05-23 ENCOUNTER — OFFICE VISIT (OUTPATIENT)
Dept: SLEEP MEDICINE | Facility: CLINIC | Age: 47
End: 2017-05-23
Payer: COMMERCIAL

## 2017-05-23 DIAGNOSIS — I10 ESSENTIAL HYPERTENSION: ICD-10-CM

## 2017-05-23 DIAGNOSIS — R53.82 CHRONIC FATIGUE: ICD-10-CM

## 2017-05-23 DIAGNOSIS — E66.01 MORBID OBESITY DUE TO EXCESS CALORIES (H): ICD-10-CM

## 2017-05-23 DIAGNOSIS — R06.83 SNORING: ICD-10-CM

## 2017-05-23 PROCEDURE — G0399 HOME SLEEP TEST/TYPE 3 PORTA: HCPCS | Performed by: FAMILY MEDICINE

## 2017-05-23 NOTE — PROGRESS NOTES
SUBJECTIVE:  Chief Complaint   Patient presents with     Sleep Study      home sleep apnea test         OBJECTIVE:  home sleep apnea test ordered.    Instructions were reviewed with Arsenio and were also printed for Arsenio to take with him.   Arsenio verbalized understanding and demonstrated use very well.     ASSESSMENT/PLAN:  Arsenio received home sleep apnea test today May 23, 2017. Pt will use device and will return on 5/24/17

## 2017-05-23 NOTE — MR AVS SNAPSHOT
"              After Visit Summary   5/23/2017    Arsenio Stockton    MRN: [id number]           Patient Information     Date Of Birth          1970        Visit Information        Provider Department      5/23/2017 4:00 PM SLEEP LAB, BED FIVE [facility]        Today's Diagnoses     Essential hypertension        Chronic fatigue        Morbid obesity due to excess calories (H)        Snoring           Follow-ups after your visit        Your next 10 appointments already scheduled     May 24, 2017  9:00 AM CDT   HST Drop Off with SLEEP LAB, BED FIVE   [facility] ([facility])    [address]  [facility] [address]   [phone]              Who to contact     If you have questions or need follow up information about today's clinic visit or your schedule please contact [facility] directly at [phone].  Normal or non-critical lab and imaging results will be communicated to you by MyChart, letter or phone within 4 business days after the clinic has received the results. If you do not hear from us within 7 days, please contact the clinic through [employer] or phone. If you have a critical or abnormal lab result, we will notify you by phone as soon as possible.  Submit refill requests through [employer] or call your pharmacy and they will forward the refill request to us. Please allow 3 business days for your refill to be completed.          Additional Information About Your Visit        MyChart Information     [employer] lets you send messages to your doctor, view your test results, renew your prescriptions, schedule appointments and more. To sign up, go to [email or web address] . Click on \"Log in\" on the left side of the screen, which will take you to the Welcome page. Then click on \"Sign up Now\" on the right side of the page.     You will be asked to enter the access code listed below, as well as some personal information. Please follow the " directions to create your username and password.     Your access code is: [email or web address]  Expires: 2017 10:11 AM     Your access code will  in 90 days. If you need help or a new code, please call your [facility] clinic or [phone].        Care EveryWhere ID     This is your Care EveryWhere ID. This could be used by other organizations to access your [facility] medical records  [id number]         Blood Pressure from Last 3 Encounters:   17 139/83   04/10/17 130/89   17 (!) 152/98    Weight from Last 3 Encounters:   17 127.5 kg (281 lb)   04/10/17 130.6 kg (288 lb)   17 130.6 kg (288 lb)              We Performed the Following     HST-Home Sleep Apnea Test        Primary Care Provider Office Phone # Fax #    Lata RODRICK De La Torre [phone]       [facility] [address] [facility] [address]        Thank you!     Thank you for choosing [facility]  for your care. Our goal is always to provide you with excellent care. Hearing back from our patients is one way we can continue to improve our services. Please take a few minutes to complete the written survey that you may receive in the mail after your visit with us. Thank you!             Your Updated Medication List - Protect others around you: Learn how to safely use, store and throw away your medicines at www.disposemymeds.org.          This list is accurate as of: 17  4:22 PM.  Always use your most recent med list.                   Brand Name Dispense Instructions for use    cetirizine 10 MG tablet    zyrTEC     Take 10 mg by mouth daily       chlorthalidone 25 MG tablet    HYGROTON    30 tablet    Take 1 tablet (25 mg) by mouth daily       OMEPRAZOLE PO      Take 20 mg by mouth every morning       POTASSIUM CITRATE PO

## 2017-05-24 ENCOUNTER — DOCUMENTATION ONLY (OUTPATIENT)
Dept: SLEEP MEDICINE | Facility: CLINIC | Age: 47
End: 2017-05-24

## 2017-05-24 NOTE — PROGRESS NOTES
SUBJECTIVE:  Chief Complaint   Patient presents with     Sleep Study     drop off home sleep apnea test       OBJECTIVE:  home sleep apnea test returned to clinic today May 24, 2017.    ASSESSMENT/PLAN:  Provider to follow up by phone

## 2017-05-27 DIAGNOSIS — G47.33 OSA (OBSTRUCTIVE SLEEP APNEA): Primary | ICD-10-CM

## 2017-05-27 NOTE — PROCEDURES
"HOME SLEEP STUDY INTERPRETATION    Patient: Arsenio Stockton  MRN: [id number]  YOB: 1970  Study Date: 2017  Referring Provider: Lata Cabral  Ordering Provider: Devon Rodriguez MD     Indications for Home Study: Arsenio Stockton is a 46 year old male with a history of HTN, obesity who presents with symptoms suggestive of obstructive sleep apnea.    Estimated body mass index is 42.41 kg/(m^2) as calculated from the following:    Height as of 17: 1.734 m (5' 8.25\").    Weight as of 17: 127.5 kg (281 lb).  No Data Recorded  STOP-BAN/8    Data: A full night home sleep study was performed recording the standard physiologic parameters including body position, movement, sound, nasal pressure, thermal oral airflow, chest and abdominal movements with respiratory inductance plethysmography, and oxygen saturation by pulse oximetry. Pulse rate was estimated by oximetry recording. This study was considered adequate based on > 4 hours of quality oximetry and respiratory recording. As specified by the AASM Manual for the Scoring of Sleep and Associated events, version 2.3, Rule VIII.D 1B, 4% oxygen desaturation scoring for hypopneas is used as a standard of care on all home sleep apnea testing.    Analysis Time:  586.9 minutes    Respiration:   Sleep Associated Hypoxemia: sustained hypoxemia was not present. Baseline oxygen saturation was 92%.  Time with saturation less than or equal to 88% was 76.3 minutes. The lowest oxygen saturation was 80%.   Snoring: Snoring was present.  Respiratory events: The home study revealed a presence of 18 obstructive apneas and 0 mixed and central apneas. There were 154 hypopneas resulting in a combined apnea/hypopnea index [AHI] of 17.6 events per hour.  AHI was 43.7 per hour supine, 0 per hour prone, 4.4 per hour on left side, and 8.3 per hour on right side.   Pattern: Excluding events noted above, respiratory rate and pattern was Normal.    Position: Percent " of time spent: supine - 28.2%, prone - 6.5%, on left - 4.6%, on right - 58.7%.    Heart Rate: By pulse oximetry normal rate was noted.     Assessment:   Moderate obstructive sleep apnea.  Sleep associated hypoxemia was present.    Recommendations:  Consider auto-CPAP at 5-15 cmH2O, oral appliance therapy, positional therapy or polysomnography with full night PAP titration.  Suggest optimizing sleep hygiene and avoiding sleep deprivation.  Weight management.    Diagnosis Code(s): Obstructive Sleep Apnea G47.33, Hypoxemia G47.36    Devon Rodriguez MD, MD, May 27, 2017   Diplomate, American Board of Family Medicine, Sleep Medicine

## 2017-05-30 NOTE — TELEPHONE ENCOUNTER
Left VM with results.  Moderate DEIRDRE with possible sleep-associated hypoxemia.  Primary concerns of snoring, daytime fatigue, co-morbid HTN.    Discussed CPAP auto-titrate, oral appliances, weight management.    Recommended patient to call with treatment choice.

## 2018-10-01 ENCOUNTER — OFFICE VISIT (OUTPATIENT)
Dept: FAMILY MEDICINE | Facility: CLINIC | Age: 48
End: 2018-10-01
Payer: COMMERCIAL

## 2018-10-01 VITALS
OXYGEN SATURATION: 96 % | DIASTOLIC BLOOD PRESSURE: 99 MMHG | HEART RATE: 75 BPM | TEMPERATURE: 99.3 F | SYSTOLIC BLOOD PRESSURE: 190 MMHG | HEIGHT: 69 IN | BODY MASS INDEX: 41.77 KG/M2 | RESPIRATION RATE: 16 BRPM | WEIGHT: 282 LBS

## 2018-10-01 DIAGNOSIS — Z23 NEED FOR PROPHYLACTIC VACCINATION AND INOCULATION AGAINST INFLUENZA: ICD-10-CM

## 2018-10-01 DIAGNOSIS — L91.8 SKIN TAG: ICD-10-CM

## 2018-10-01 DIAGNOSIS — E66.01 MORBID OBESITY (H): ICD-10-CM

## 2018-10-01 DIAGNOSIS — J31.0 CHRONIC RHINITIS: ICD-10-CM

## 2018-10-01 DIAGNOSIS — G47.30 SLEEP APNEA, UNSPECIFIED TYPE: ICD-10-CM

## 2018-10-01 DIAGNOSIS — I10 BENIGN ESSENTIAL HYPERTENSION: Primary | ICD-10-CM

## 2018-10-01 DIAGNOSIS — L28.0 LICHEN SIMPLEX, CHRONIC: ICD-10-CM

## 2018-10-01 DIAGNOSIS — K21.9 GASTROESOPHAGEAL REFLUX DISEASE WITHOUT ESOPHAGITIS: ICD-10-CM

## 2018-10-01 LAB
HBA1C MFR BLD: 5.5 % (ref 0–5.6)
TSH SERPL DL<=0.005 MIU/L-ACNC: 0.68 MU/L (ref 0.4–4)

## 2018-10-01 PROCEDURE — 90471 IMMUNIZATION ADMIN: CPT | Performed by: NURSE PRACTITIONER

## 2018-10-01 PROCEDURE — 83036 HEMOGLOBIN GLYCOSYLATED A1C: CPT | Performed by: NURSE PRACTITIONER

## 2018-10-01 PROCEDURE — 36415 COLL VENOUS BLD VENIPUNCTURE: CPT | Performed by: NURSE PRACTITIONER

## 2018-10-01 PROCEDURE — 80053 COMPREHEN METABOLIC PANEL: CPT | Performed by: NURSE PRACTITIONER

## 2018-10-01 PROCEDURE — 99214 OFFICE O/P EST MOD 30 MIN: CPT | Mod: 25 | Performed by: NURSE PRACTITIONER

## 2018-10-01 PROCEDURE — 84443 ASSAY THYROID STIM HORMONE: CPT | Performed by: NURSE PRACTITIONER

## 2018-10-01 PROCEDURE — 90686 IIV4 VACC NO PRSV 0.5 ML IM: CPT | Performed by: NURSE PRACTITIONER

## 2018-10-01 RX ORDER — CHLORTHALIDONE 25 MG/1
50 TABLET ORAL DAILY
Qty: 90 TABLET | Refills: 3 | Status: SHIPPED | OUTPATIENT
Start: 2018-10-01 | End: 2019-09-24

## 2018-10-01 RX ORDER — TRIAMCINOLONE ACETONIDE 1 MG/G
OINTMENT TOPICAL
Qty: 30 G | Refills: 0 | Status: SHIPPED | OUTPATIENT
Start: 2018-10-01 | End: 2023-10-31

## 2018-10-01 RX ORDER — CETIRIZINE HYDROCHLORIDE 10 MG/1
10 TABLET ORAL EVERY EVENING
Qty: 90 TABLET | Refills: 1 | Status: SHIPPED | OUTPATIENT
Start: 2018-10-01 | End: 2019-03-29

## 2018-10-01 ASSESSMENT — PATIENT HEALTH QUESTIONNAIRE - PHQ9: 5. POOR APPETITE OR OVEREATING: SEVERAL DAYS

## 2018-10-01 ASSESSMENT — ANXIETY QUESTIONNAIRES
3. WORRYING TOO MUCH ABOUT DIFFERENT THINGS: SEVERAL DAYS
GAD7 TOTAL SCORE: 6
2. NOT BEING ABLE TO STOP OR CONTROL WORRYING: SEVERAL DAYS
6. BECOMING EASILY ANNOYED OR IRRITABLE: SEVERAL DAYS
5. BEING SO RESTLESS THAT IT IS HARD TO SIT STILL: NOT AT ALL
1. FEELING NERVOUS, ANXIOUS, OR ON EDGE: NOT AT ALL
7. FEELING AFRAID AS IF SOMETHING AWFUL MIGHT HAPPEN: MORE THAN HALF THE DAYS

## 2018-10-01 NOTE — MR AVS SNAPSHOT
After Visit Summary   10/1/2018    Arsenio Stockton    MRN: [id number]           Patient Information     Date Of Birth          1970        Visit Information        Provider Department      10/1/2018 1:20 PM Lata Cabral APRN [facility]        Today's Diagnoses     Benign essential hypertension    -  1    Sleep apnea, unspecified type        Morbid obesity (H)        Gastroesophageal reflux disease without esophagitis        Lichen simplex, chronic        Chronic rhinitis        Skin tag          Care Instructions    Return to clinic to have blood pressure rechecked next week.  Resume medication daily.  Follow up with sleep medicine. [phone]  Warm packs to spot behind right ear.  Zyrtec and Zantac and topical steroid sent to pharmacy.  Follow up with dermatology.    Appointment made for follow up with me in 1 month as well.        Thank you for choosing [facility].  You may be receiving a survey in the mail from [employer] regarding your visit today.  Please take a few minutes to complete and return the survey to let us know how we are doing.      Our Clinic hours are:  Mondays    7:20 am - 7 pm  Tues -  Fri  7:20 am - 5 pm    Clinic Phone: [phone]    The clinic lab opens at 7:30 am Mon - Fri and appointments are required.    [facility]. [phone]  Monday  8 am - 7pm  Tues - Fri 8 am - 5:30 pm                 Follow-ups after your visit        Additional Services     DERMATOLOGY REFERRAL       Your provider has referred you to: [facility]: [facility] [phone]   [email or web address]    Please be aware that coverage of these services is subject to the terms and limitations of your health insurance plan.  Call member services at your health plan with any benefit or coverage questions.      Please bring the following with you to your appointment:    (1) Any X-Rays, CTs or MRIs which  "have been performed.  Contact the facility where they were done to arrange for  prior to your scheduled appointment.    (2) List of current medications  (3) This referral request   (4) Any documents/labs given to you for this referral                  Follow-up notes from your care team     Return in about 1 week (around 10/8/2018) for BP Recheck.      Your next 10 appointments already scheduled     Oct 29, 2018 12:40 PM CDT   SHORT with RODRICK Bustillos CNP   [facility] ([facility])    [address]  [facility] [address]   [phone]              Who to contact     If you have questions or need follow up information about today's clinic visit or your schedule please contact [facility] directly at [phone].  Normal or non-critical lab and imaging results will be communicated to you by MyChart, letter or phone within 4 business days after the clinic has received the results. If you do not hear from us within 7 days, please contact the clinic through MyChart or phone. If you have a critical or abnormal lab result, we will notify you by phone as soon as possible.  Submit refill requests through [employer] or call your pharmacy and they will forward the refill request to us. Please allow 3 business days for your refill to be completed.          Additional Information About Your Visit        Care EveryWhere ID     This is your Care EveryWhere ID. This could be used by other organizations to access your [place] medical records  [id number]        Your Vitals Were     Pulse Temperature Respirations Height Pulse Oximetry BMI (Body Mass Index)    75 99.3  F (37.4  C) (Oral) 16 5' 8.5\" (1.74 m) 96% 42.25 kg/m2       Blood Pressure from Last 3 Encounters:   10/01/18 (!) 190/99   05/08/17 139/83   04/10/17 130/89    Weight from Last 3 Encounters:   10/01/18 282 lb (127.9 kg)   05/08/17 281 lb (127.5 kg)   04/10/17 288 lb (130.6 kg)            "   We Performed the Following     DERMATOLOGY REFERRAL     TSH with free T4 reflex          Today's Medication Changes          These changes are accurate as of 10/1/18  1:56 PM.  If you have any questions, ask your nurse or doctor.               Start taking these medicines.        Dose/Directions    ranitidine 150 MG tablet   Commonly known as:  ZANTAC   Used for:  Gastroesophageal reflux disease without esophagitis   Started by:  Lata Cabral APRN CNP        Dose:  150 mg   Take 1 tablet (150 mg) by mouth 2 times daily   Quantity:  60 tablet   Refills:  1       triamcinolone 0.1 % ointment   Commonly known as:  KENALOG   Used for:  Lichen simplex, chronic   Started by:  Lata Cabral APRN CNP        Apply sparingly to affected area three times daily for 14 days.   Quantity:  30 g   Refills:  0         These medicines have changed or have updated prescriptions.        Dose/Directions    * cetirizine 10 MG tablet   Commonly known as:  zyrTEC   This may have changed:  Another medication with the same name was added. Make sure you understand how and when to take each.   Changed by:  Lata Cabral APRN CNP        Dose:  10 mg   Take 10 mg by mouth daily   Refills:  0       * cetirizine 10 MG tablet   Commonly known as:  zyrTEC   This may have changed:  You were already taking a medication with the same name, and this prescription was added. Make sure you understand how and when to take each.   Used for:  Chronic rhinitis   Changed by:  Lata Cabral APRN CNP        Dose:  10 mg   Take 1 tablet (10 mg) by mouth every evening   Quantity:  90 tablet   Refills:  1       chlorthalidone 25 MG tablet   Commonly known as:  HYGROTON   This may have changed:  how much to take   Used for:  Benign essential hypertension   Changed by:  Lata Cabral APRN CNP        Dose:  50 mg   Take 2 tablets (50 mg) by mouth daily   Quantity:  90 tablet   Refills:  3       * Notice:  This list has 2  medication(s) that are the same as other medications prescribed for you. Read the directions carefully, and ask your doctor or other care provider to review them with you.         Where to get your medicines      These medications were sent to [facility] - - [place] - [id number] [facility]  [id number] [place], [place] [address] [address]    Hours:  CECILE [facility] Phone:  [phone]     cetirizine 10 MG tablet    chlorthalidone 25 MG tablet    ranitidine 150 MG tablet    triamcinolone 0.1 % ointment                Primary Care Provider Office Phone # Fax #    Lata Cabral, APRN [phone] [address] [facility] [address]        Equal Access to Services     JOHAN MENA : Hadii julio caban hadasho Soomaali, waaxda luqadaha, qaybta kaalmada adeegyada, irene khanna haykristen bagley . So [facility] [phone].    ATENCIÓN: Si habla español, tiene a gibson disposición servicios gratuitos de asistencia lingüística. Llame al [phone].    We comply with applicable federal civil rights laws and [place] laws. We do not discriminate on the basis of race, color, national origin, age, disability, sex, sexual orientation, or gender identity.            Thank you!     Thank you for choosing [facility]  for your care. Our goal is always to provide you with excellent care. Hearing back from our patients is one way we can continue to improve our services. Please take a few minutes to complete the written survey that you may receive in the mail after your visit with us. Thank you!             Your Updated Medication List - Protect others around you: Learn how to safely use, store and throw away your medicines at www.disposemymeds.org.          This list is accurate as of 10/1/18  1:56 PM.  Always use your most recent med list.                   Brand Name Dispense Instructions for use Diagnosis    * cetirizine 10 MG tablet    zyrTEC      Take 10 mg by mouth daily        * cetirizine 10 MG tablet    zyrTEC    90 tablet    Take 1 tablet (10 mg) by mouth every evening    Chronic rhinitis       chlorthalidone 25 MG tablet    HYGROTON    90 tablet    Take 2 tablets (50 mg) by mouth daily    Benign essential hypertension       OMEPRAZOLE PO      Take 20 mg by mouth every morning        POTASSIUM CITRATE PO      Over the counter unsure of mg.        ranitidine 150 MG tablet    ZANTAC    60 tablet    Take 1 tablet (150 mg) by mouth 2 times daily    Gastroesophageal reflux disease without esophagitis       triamcinolone 0.1 % ointment    KENALOG    30 g    Apply sparingly to affected area three times daily for 14 days.    Lichen simplex, chronic       * Notice:  This list has 2 medication(s) that are the same as other medications prescribed for you. Read the directions carefully, and ask your doctor or other care provider to review them with you.

## 2018-10-01 NOTE — PATIENT INSTRUCTIONS
Return to clinic to have blood pressure rechecked next week.  Resume medication daily.  Follow up with sleep medicine. [phone]  Warm packs to spot behind right ear.  Zyrtec and Zantac and topical steroid sent to pharmacy.  Follow up with dermatology.    Appointment made for follow up with me in 1 month as well.        Thank you for choosing [facility].  You may be receiving a survey in the mail from [employer] regarding your visit today.  Please take a few minutes to complete and return the survey to let us know how we are doing.      Our Clinic hours are:  Mondays    7:20 am - 7 pm  Tues -  Fri  7:20 am - 5 pm    Clinic Phone: [phone]    The clinic lab opens at 7:30 am Mon - Fri and appointments are required.    [facility]. [phone]  Monday  8 am - 7pm  Tues - Fri 8 am - 5:30 pm

## 2018-10-01 NOTE — LETTER
[facility]  [address]  [place] [place] [address]  Phone: [phone]      10/2/2018     Arsenio Stockton  [address] [place] [address]      Dear Arsenio:    Thank you for allowing me to participate in your care. Your recent test results were reviewed and listed below.      Your results are provided below for your review  Results for orders placed or performed in visit on 10/01/18   TSH with free T4 reflex   Result Value Ref Range    TSH 0.68 0.40 - 4.00 mU/L   Hemoglobin A1c   Result Value Ref Range    Hemoglobin A1C 5.5 0 - 5.6 %   Comprehensive metabolic panel   Result Value Ref Range    Sodium 141 133 - 144 mmol/L    Potassium 3.8 3.4 - 5.3 mmol/L    Chloride 107 94 - 109 mmol/L    Carbon Dioxide 25 20 - 32 mmol/L    Anion Gap 9 3 - 14 mmol/L    Glucose 109 (H) 70 - 99 mg/dL    Urea Nitrogen 8 7 - 30 mg/dL    Creatinine 0.91 0.66 - 1.25 mg/dL    GFR Estimate 90 >60 mL/min/1.7m2    GFR Estimate If Black >90 >60 mL/min/1.7m2    Calcium 8.1 (L) 8.5 - 10.1 mg/dL    Bilirubin Total 0.4 0.2 - 1.3 mg/dL    Albumin 3.8 3.4 - 5.0 g/dL    Protein Total 7.2 6.8 - 8.8 g/dL    Alkaline Phosphatase 84 40 - 150 U/L    ALT 37 0 - 70 U/L    AST 22 0 - 45 U/L       Labs were stable.                  Thank you for choosing [facility]. As a result, please continue with the treatment plan discussed in the office. Return as discussed or sooner if symptoms worsen or fail to improve.     If you have any further questions or concerns, please do not hesitate to contact us.      Sincerely,        CANDY Mckenzie/LUCRECIA

## 2018-10-01 NOTE — PROGRESS NOTES
SUBJECTIVE:   Arsenio Stockton is a 47 year old male who presents to clinic today for the following health issues:  not on Hygroton, ran out many months ago.    Hypertension Follow-up      Outpatient blood pressures are being checked at store.  Results are high.    Low Salt Diet: not monitoring salt      Amount of exercise or physical activity: None    Problems taking medications regularly: Yes,  Has not gotten refilled    Medication side effects: none    Diet: regular (no restrictions)  Chief Complaint   Patient presents with     Hypertension     Derm Problem     skin tag on left waist line, has a skin spot on his right lower leg he has been seen for in the past. He would like Rx for Certazine.     Ear Problem     Gastrophageal Reflux     wants an RX for Prilosec.     Mass     back of right head. His brother noticed it last Christmas and has not changed since then.             Problem list and histories reviewed & adjusted, as indicated.  Additional history: he has stopped his blood pressure medication, just didn't refill it or follow up as told to.  Denies any symptoms or concern with his blood pressure.   He has a long other list of concerns. His wife is unhappy, he is a bit depressed about that. He has a large skin tag. He has a plaque rash that he has seen dermatology for and has ran out of treatment for that.  Would like prescriptions for his Zantac and Zyrtec which control his GERD and allergy symptoms.  His left ear has been sore. He has a lump behind right ear, been there for months, he didn't even notice it, a relative did at [place].      Patient Active Problem List   Diagnosis     CARDIOVASCULAR SCREENING; LDL GOAL LESS THAN 160     Elevated blood pressure reading without diagnosis of hypertension     Benign essential hypertension     Sleep apnea     Morbid obesity (H)     GERD (gastroesophageal reflux disease)     History reviewed. No pertinent surgical history.    Social History   Substance Use Topics      Smoking status: Former Smoker     Years: 3.00     Types: Dip, chew, snus or snuff     Smokeless tobacco: Current User     Types: Chew     Last attempt to quit: 1/8/2014     Alcohol use Yes      Comment: occ     Family History   Problem Relation Age of Onset     Unknown/Adopted Maternal Grandmother      Unknown/Adopted Maternal Grandfather          Current Outpatient Prescriptions   Medication Sig Dispense Refill     cetirizine (ZYRTEC) 10 MG tablet Take 1 tablet (10 mg) by mouth every evening 90 tablet 1     cetirizine (ZYRTEC) 10 MG tablet Take 10 mg by mouth daily       chlorthalidone (HYGROTON) 25 MG tablet Take 2 tablets (50 mg) by mouth daily 90 tablet 3     OMEPRAZOLE PO Take 20 mg by mouth every morning       POTASSIUM CITRATE PO Over the counter unsure of mg.       ranitidine (ZANTAC) 150 MG tablet Take 1 tablet (150 mg) by mouth 2 times daily 60 tablet 1     triamcinolone (KENALOG) 0.1 % ointment Apply sparingly to affected area three times daily for 14 days. 30 g 0     [DISCONTINUED] chlorthalidone (HYGROTON) 25 MG tablet Take 1 tablet (25 mg) by mouth daily (Patient not taking: Reported on 10/1/2018) 30 tablet 3     No Known Allergies  Recent Labs   Lab Test  04/10/17   1435  03/06/15   1440   LDL   --   90   ALT  46   --    CR  0.95  0.91   GFRESTIMATED  86  >90  Non  GFR Calc     GFRESTBLACK  >90   GFR Calc    >90   GFR Calc     POTASSIUM  3.1*  3.8   TSH  0.39*   --       BP Readings from Last 3 Encounters:   10/01/18 (!) 190/99   05/08/17 139/83   04/10/17 130/89    Wt Readings from Last 3 Encounters:   10/01/18 282 lb (127.9 kg)   05/08/17 281 lb (127.5 kg)   04/10/17 288 lb (130.6 kg)                    Reviewed and updated as needed this visit by clinical staff  Tobacco  Allergies  Meds  Med Hx  Surg Hx  Fam Hx  Soc Hx      Reviewed and updated as needed this visit by Provider         ROS:  Constitutional, HEENT, cardiovascular, pulmonary,  "GI, , musculoskeletal, neuro, skin, endocrine and psych systems are negative, except as otherwise noted.    OBJECTIVE:     BP (!) 190/99 (BP Location: Right arm, Patient Position: Chair, Cuff Size: Adult Large)  Pulse 75  Temp 99.3  F (37.4  C) (Oral)  Resp 16  Ht 5' 8.5\" (1.74 m)  Wt 282 lb (127.9 kg)  SpO2 96%  BMI 42.25 kg/m2  Body mass index is 42.25 kg/(m^2).  GENERAL: healthy, alert and no distress  HENT: ear canals and TM's normal, pharynx without erythema, he has bony prominence behind both ears, area behind right ear appears to have mild inflammation  NECK: no adenopathy, no asymmetry  RESP: lungs clear to auscultation - no rales, rhonchi or wheezes  CV: regular rate and rhythm, normal S1 S2, no S3 or S4, no murmur  ABDOMEN: soft, obese  MS: no gross musculoskeletal defects noted  SKIN: large skin tag left lower abdomen    Diagnostic Test Results:  No results found for this or any previous visit (from the past 24 hour(s)).    ASSESSMENT/PLAN:             1. Benign essential hypertension    - chlorthalidone (HYGROTON) 25 MG tablet; Take 2 tablets (50 mg) by mouth daily  Dispense: 90 tablet; Refill: 3  - TSH with free T4 reflex  - Comprehensive metabolic panel; Future  - Hemoglobin A1c  Reviewed need to stay on medications and risks of untreated hypertension. He didn't seem too concerned.    2. Sleep apnea, unspecified type    He will need to follow up with sleep medicine to initiate treatment for sleep apnea.    3. Morbid obesity (H)    - Hemoglobin A1c    4. Gastroesophageal reflux disease without esophagitis    - ranitidine (ZANTAC) 150 MG tablet; Take 1 tablet (150 mg) by mouth 2 times daily  Dispense: 60 tablet; Refill: 1    5. Lichen simplex, chronic    - triamcinolone (KENALOG) 0.1 % ointment; Apply sparingly to affected area three times daily for 14 days.  Dispense: 30 g; Refill: 0  - DERMATOLOGY REFERRAL    6. Chronic rhinitis    - cetirizine (ZYRTEC) 10 MG tablet; Take 1 tablet (10 mg) by " mouth every evening  Dispense: 90 tablet; Refill: 1    7. Skin tag    - DERMATOLOGY REFERRAL    8. Need for prophylactic vaccination and inoculation against influenza    - FLU VACCINE, SPLIT VIRUS, IM (QUADRIVALENT) [07222]- >3 YRS  - Vaccine Administration, Initial [29196]    See Patient Instructions  Patient Instructions   Return to clinic to have blood pressure rechecked next week.  Resume medication daily.  Follow up with sleep medicine. [phone]  Warm packs to spot behind right ear.  Zyrtec and Zantac and topical steroid sent to pharmacy.  Follow up with dermatology.    Appointment made for follow up with me in 1 month as well.        Thank you for choosing [facility].  You may be receiving a survey in the mail from [employer] regarding your visit today.  Please take a few minutes to complete and return the survey to let us know how we are doing.      Our Clinic hours are:  Mondays    7:20 am - 7 pm  Tues -  Fri  7:20 am - 5 pm    Clinic Phone: [phone]    The clinic lab opens at 7:30 am Mon - Fri and appointments are required.    [facility] [place]  Ph. [phone]  Monday  8 am - 7pm  Tues - Fri 8 am - 5:30 pm             RODRICK Bustillos CNP  [facility]      Injectable Influenza Immunization Documentation    1.  Is the person to be vaccinated sick today?   No    2. Does the person to be vaccinated have an allergy to a component   of the vaccine?   No  Egg Allergy Algorithm Link    3. Has the person to be vaccinated ever had a serious reaction   to influenza vaccine in the past?   No    4. Has the person to be vaccinated ever had Guillain-Barré syndrome?   No    Form completed by Kalina Diehl MA

## 2018-10-02 LAB
ALBUMIN SERPL-MCNC: 3.8 G/DL (ref 3.4–5)
ALP SERPL-CCNC: 84 U/L (ref 40–150)
ALT SERPL W P-5'-P-CCNC: 37 U/L (ref 0–70)
ANION GAP SERPL CALCULATED.3IONS-SCNC: 9 MMOL/L (ref 3–14)
AST SERPL W P-5'-P-CCNC: 22 U/L (ref 0–45)
BILIRUB SERPL-MCNC: 0.4 MG/DL (ref 0.2–1.3)
BUN SERPL-MCNC: 8 MG/DL (ref 7–30)
CALCIUM SERPL-MCNC: 8.1 MG/DL (ref 8.5–10.1)
CHLORIDE SERPL-SCNC: 107 MMOL/L (ref 94–109)
CO2 SERPL-SCNC: 25 MMOL/L (ref 20–32)
CREAT SERPL-MCNC: 0.91 MG/DL (ref 0.66–1.25)
GFR SERPL CREATININE-BSD FRML MDRD: 90 ML/MIN/1.7M2
GLUCOSE SERPL-MCNC: 109 MG/DL (ref 70–99)
POTASSIUM SERPL-SCNC: 3.8 MMOL/L (ref 3.4–5.3)
PROT SERPL-MCNC: 7.2 G/DL (ref 6.8–8.8)
SODIUM SERPL-SCNC: 141 MMOL/L (ref 133–144)

## 2018-10-02 ASSESSMENT — ANXIETY QUESTIONNAIRES: GAD7 TOTAL SCORE: 6

## 2018-10-02 ASSESSMENT — PATIENT HEALTH QUESTIONNAIRE - PHQ9: SUM OF ALL RESPONSES TO PHQ QUESTIONS 1-9: 10

## 2018-10-05 ENCOUNTER — OFFICE VISIT (OUTPATIENT)
Dept: DERMATOLOGY | Facility: CLINIC | Age: 48
End: 2018-10-05
Payer: COMMERCIAL

## 2018-10-05 VITALS — OXYGEN SATURATION: 96 % | HEART RATE: 82 BPM | DIASTOLIC BLOOD PRESSURE: 89 MMHG | SYSTOLIC BLOOD PRESSURE: 144 MMHG

## 2018-10-05 DIAGNOSIS — D48.5 NEOPLASM OF UNCERTAIN BEHAVIOR OF SKIN: Primary | ICD-10-CM

## 2018-10-05 PROCEDURE — 11100 ZZHC BIOPSY SKIN/SUBQ/MUC MEM, SINGLE LESION: CPT | Mod: 59 | Performed by: PHYSICIAN ASSISTANT

## 2018-10-05 PROCEDURE — 88305 TISSUE EXAM BY PATHOLOGIST: CPT | Mod: TC | Performed by: PHYSICIAN ASSISTANT

## 2018-10-05 PROCEDURE — 99213 OFFICE O/P EST LOW 20 MIN: CPT | Performed by: PHYSICIAN ASSISTANT

## 2018-10-05 PROCEDURE — 11200 RMVL SKIN TAGS UP TO&INC 15: CPT | Mod: 51 | Performed by: PHYSICIAN ASSISTANT

## 2018-10-05 NOTE — MR AVS SNAPSHOT
After Visit Summary   10/5/2018    Arsenio Stockton    MRN: [id number]           Patient Information     Date Of Birth          1970        Visit Information        Provider Department      10/5/2018 9:20 AM Lien Thomas PA-C [facility]        Today's Diagnoses     Neoplasm of uncertain behavior of skin    -  1       Follow-ups after your visit        Your next 10 appointments already scheduled     Oct 29, 2018 12:40 PM CDT   SHORT with RODRICK Bustillos CNP   [facility] ([facility])    [address]  [facility] [address]   [phone]            Nov 09, 2018 10:20 AM CST   Return Visit with Lien Thomas PA-C   [facility] ([facility])    [address] [facility] [address] [phone]              Who to contact     If you have questions or need follow up information about today's clinic visit or your schedule please contact [facility] directly at [phone].  Normal or non-critical lab and imaging results will be communicated to you by MyChart, letter or phone within 4 business days after the clinic has received the results. If you do not hear from us within 7 days, please contact the clinic through MyChart or phone. If you have a critical or abnormal lab result, we will notify you by phone as soon as possible.  Submit refill requests through [employer] or call your pharmacy and they will forward the refill request to us. Please allow 3 business days for your refill to be completed.          Additional Information About Your Visit        Care EveryWhere ID     This is your Care EveryWhere ID. This could be used by other organizations to access your [place] medical records  [id number]        Your Vitals Were     Pulse Pulse Oximetry                82 96%           Blood Pressure from Last 3 Encounters:   10/05/18 144/89   10/01/18 (!) 190/99   05/08/17  139/83    Weight from Last 3 Encounters:   10/01/18 127.9 kg (282 lb)   05/08/17 127.5 kg (281 lb)   04/10/17 130.6 kg (288 lb)              We Performed the Following     Dermatological path order and indications        Primary Care Provider Office Phone # Fax #    RODRICK Bustillos [phone] [address] [facility] [address]        Equal Access to Services     JOHAN MENA : Hadii aad ku hadasho Soomaali, waaxda luqadaha, qaybta kaalmada adeegyada, waxay idiin hayaan adeeg kharash la'aan . So [facility] [phone].    ATENCIÓN: Si habla español, tiene a gibson disposición servicios gratuitos de asistencia lingüística. [facility] [phone].    We comply with applicable federal civil rights laws and [place] laws. We do not discriminate on the basis of race, color, national origin, age, disability, sex, sexual orientation, or gender identity.            Thank you!     Thank you for choosing [facility]  for your care. Our goal is always to provide you with excellent care. Hearing back from our patients is one way we can continue to improve our services. Please take a few minutes to complete the written survey that you may receive in the mail after your visit with us. Thank you!             Your Updated Medication List - Protect others around you: Learn how to safely use, store and throw away your medicines at www.disposemymeds.org.          This list is accurate as of 10/5/18 11:59 PM.  Always use your most recent med list.                   Brand Name Dispense Instructions for use Diagnosis    cetirizine 10 MG tablet    zyrTEC    90 tablet    Take 1 tablet (10 mg) by mouth every evening    Chronic rhinitis       chlorthalidone 25 MG tablet    HYGROTON    90 tablet    Take 2 tablets (50 mg) by mouth daily    Benign essential hypertension       OMEPRAZOLE PO      Take 20 mg by mouth every morning        POTASSIUM CITRATE PO      Over the counter unsure of mg.         ranitidine 150 MG tablet    ZANTAC    60 tablet    Take 1 tablet (150 mg) by mouth 2 times daily    Gastroesophageal reflux disease without esophagitis       triamcinolone 0.1 % ointment    KENALOG    30 g    Apply sparingly to affected area three times daily for 14 days.    Lichen simplex, chronic

## 2018-10-05 NOTE — LETTER
[facility]  [address] [facility] [address]  Phone: [phone]    October 9, 2018    Arsenio Mahin                                                                                                                [address]  [facility] [address]            Dear Mr. 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 No further treatment is needed.     Thank you,      Lien POWERS / andrew

## 2018-10-05 NOTE — NURSING NOTE
"Initial /89  Pulse 82  SpO2 96% Estimated body mass index is 42.25 kg/(m^2) as calculated from the following:    Height as of 10/1/18: 1.74 m (5' 8.5\").    Weight as of 10/1/18: 127.9 kg (282 lb). .      "

## 2018-10-05 NOTE — PROGRESS NOTES
Arsenio Stockton is a 47 year old year old male patient here today for skin tags that are causing with clothing. He notes that he started working out and these started to form. Patient has no other skin complaints today.  Remainder of the HPI, Meds, PMH, Allergies, FH, and SH was reviewed in chart.    Past Medical History:   Diagnosis Date     GERD (gastroesophageal reflux disease)      Hypertension      Sleep apnea        History reviewed. No pertinent surgical history.     Family History   Problem Relation Age of Onset     Unknown/Adopted Maternal Grandmother      Unknown/Adopted Maternal Grandfather        Social History     Social History     Marital status:      Spouse name: N/A     Number of children: N/A     Years of education: N/A     Occupational History      [employer]     Social History Main Topics     Smoking status: Former Smoker     Years: 3.00     Types: Dip, chew, snus or snuff     Smokeless tobacco: Current User     Types: Chew     Last attempt to quit: 1/8/2014     Alcohol use Yes      Comment: occ     Drug use: Yes     Special: Marijuana     Sexual activity: Yes     Partners: Female     Other Topics Concern     Parent/Sibling W/ Cabg, Mi Or Angioplasty Before 65f 55m? No     Social History Narrative       Outpatient Encounter Prescriptions as of 10/5/2018   Medication Sig Dispense Refill     cetirizine (ZYRTEC) 10 MG tablet Take 1 tablet (10 mg) by mouth every evening 90 tablet 1     chlorthalidone (HYGROTON) 25 MG tablet Take 2 tablets (50 mg) by mouth daily 90 tablet 3     OMEPRAZOLE PO Take 20 mg by mouth every morning       POTASSIUM CITRATE PO Over the counter unsure of mg.       ranitidine (ZANTAC) 150 MG tablet Take 1 tablet (150 mg) by mouth 2 times daily 60 tablet 1     triamcinolone (KENALOG) 0.1 % ointment Apply sparingly to affected area three times daily for 14 days. 30 g 0     [DISCONTINUED] cetirizine (ZYRTEC) 10 MG tablet Take 10 mg by mouth daily       No  facility-administered encounter medications on file as of 10/5/2018.              Review Of Systems  Skin: As above  Eyes: negative  Ears/Nose/Throat: negative  Respiratory: No shortness of breath, dyspnea on exertion, cough, or hemoptysis  Cardiovascular: negative  Gastrointestinal: negative  Genitourinary: negative  Musculoskeletal: negative  Neurologic: negative  Psychiatric: negative  Hematologic/Lymphatic/Immunologic: negative  Endocrine: negative      O:   NAD, WDWN, Alert & Oriented, Mood & Affect wnl, Vitals stable   Here today alone   /89  Pulse 82  SpO2 96%   General appearance normal   Vitals stable   Alert, oriented and in no acute distress     Think scaly lichenified plaque on right lower leg   Skin colored pedunculated papule on left thigh  1.2 cm eroded pink plaque on left groin     Eyes: Conjunctivae/lids:Normal     ENT: Lips: normal    MSK:Normal    Pulm: Breathing Normal    Neuro/Psych: Orientation:Normal; Mood/Affect:Normal  A/P:  1. R/O irritated fibroma vs neurofibroma on left groin  TANGENTIAL BIOPSY SENT OUT:  After consent, anesthesia with LEC and prep, tangential excision performed and specimen sent out for permanent section histology.  No complications and routine wound care. Patient told to call our office in 1-2 weeks for result.      2. Inflamed skin tag on left thigh  After consent, anesthesia with LEC and prep, tangential excision performed.  No complications and routine wound care.  3. LSC   Restarting topical medications just 1-2 days.   Discussed to try intralesional steroids.      Will recheck in one month.   Skin care regimen reviewed with patient: Eliminate harsh soaps, i.e. Dial, zest, irsih spring; Mild soaps such as Cetaphil or Dove sensitive skin, avoid hot or cold showers, aggressive use of emollients including vanicream, cetaphil or cerave discussed with patient.    Patient to follow up with Primary Care provider regarding elevated blood pressure.

## 2018-10-05 NOTE — LETTER
10/5/2018         RE: Arsenio Stockton  [address] [facility] [address]        Dear Colleague,    Thank you for referring your patient, Arsenio Stockton, to the [facility]. Please see a copy of my visit note below.    Arsenio Stockton is a 47 year old year old male patient here today for skin tags that are causing with clothing. He notes that he started working out and these started to form. Patient has no other skin complaints today.  Remainder of the HPI, Meds, PMH, Allergies, FH, and SH was reviewed in chart.    Past Medical History:   Diagnosis Date     GERD (gastroesophageal reflux disease)      Hypertension      Sleep apnea        History reviewed. No pertinent surgical history.     Family History   Problem Relation Age of Onset     Unknown/Adopted Maternal Grandmother      Unknown/Adopted Maternal Grandfather        Social History     Social History     Marital status:      Spouse name: N/A     Number of children: N/A     Years of education: N/A     Occupational History      Running [employer]     Social History Main Topics     Smoking status: Former Smoker     Years: 3.00     Types: Dip, chew, snus or snuff     Smokeless tobacco: Current User     Types: Chew     Last attempt to quit: 1/8/2014     Alcohol use Yes      Comment: occ     Drug use: Yes     Special: Marijuana     Sexual activity: Yes     Partners: Female     Other Topics Concern     Parent/Sibling W/ Cabg, Mi Or Angioplasty Before 65f 55m? No     Social History Narrative       Outpatient Encounter Prescriptions as of 10/5/2018   Medication Sig Dispense Refill     cetirizine (ZYRTEC) 10 MG tablet Take 1 tablet (10 mg) by mouth every evening 90 tablet 1     chlorthalidone (HYGROTON) 25 MG tablet Take 2 tablets (50 mg) by mouth daily 90 tablet 3     OMEPRAZOLE PO Take 20 mg by mouth every morning       POTASSIUM CITRATE PO Over the counter unsure of mg.       ranitidine (ZANTAC) 150 MG tablet Take 1 tablet (150 mg)  by mouth 2 times daily 60 tablet 1     triamcinolone (KENALOG) 0.1 % ointment Apply sparingly to affected area three times daily for 14 days. 30 g 0     [DISCONTINUED] cetirizine (ZYRTEC) 10 MG tablet Take 10 mg by mouth daily       No facility-administered encounter medications on file as of 10/5/2018.              Review Of Systems  Skin: As above  Eyes: negative  Ears/Nose/Throat: negative  Respiratory: No shortness of breath, dyspnea on exertion, cough, or hemoptysis  Cardiovascular: negative  Gastrointestinal: negative  Genitourinary: negative  Musculoskeletal: negative  Neurologic: negative  Psychiatric: negative  Hematologic/Lymphatic/Immunologic: negative  Endocrine: negative      O:   NAD, WDWN, Alert & Oriented, Mood & Affect wnl, Vitals stable   Here today alone   /89  Pulse 82  SpO2 96%   General appearance normal   Vitals stable   Alert, oriented and in no acute distress     Think scaly lichenified plaque on right lower leg   Skin colored pedunculated papule on left thigh  1.2 cm eroded pink plaque on left groin     Eyes: Conjunctivae/lids:Normal     ENT: Lips: normal    MSK:Normal    Pulm: Breathing Normal    Neuro/Psych: Orientation:Normal; Mood/Affect:Normal  A/P:  1. R/O irritated fibroma vs neurofibroma on left groin  TANGENTIAL BIOPSY SENT OUT:  After consent, anesthesia with LEC and prep, tangential excision performed and specimen sent out for permanent section histology.  No complications and routine wound care. Patient told to call our office in 1-2 weeks for result.      2. Inflamed skin tag on left thigh  After consent, anesthesia with LEC and prep, tangential excision performed.  No complications and routine wound care.  3. LSC   Restarting topical medications just 1-2 days.   Discussed to try intralesional steroids.      Will recheck in one month.   Skin care regimen reviewed with patient: Eliminate harsh soaps, i.e. Dial, zest, irsih spring; Mild soaps such as Cetaphil or Dove  sensitive skin, avoid hot or cold showers, aggressive use of emollients including vanicream, cetaphil or cerave discussed with patient.    Patient to follow up with Primary Care provider regarding elevated blood pressure.      Again, thank you for allowing me to participate in the care of your patient.        Sincerely,        Lien Allison PA-C

## 2018-10-09 LAB — COPATH REPORT: NORMAL

## 2018-10-19 ENCOUNTER — OFFICE VISIT (OUTPATIENT)
Dept: FAMILY MEDICINE | Facility: CLINIC | Age: 48
End: 2018-10-19
Payer: COMMERCIAL

## 2018-10-19 VITALS
RESPIRATION RATE: 18 BRPM | HEIGHT: 69 IN | TEMPERATURE: 98.3 F | OXYGEN SATURATION: 94 % | HEART RATE: 74 BPM | DIASTOLIC BLOOD PRESSURE: 80 MMHG | SYSTOLIC BLOOD PRESSURE: 120 MMHG | BODY MASS INDEX: 39.25 KG/M2 | WEIGHT: 265 LBS

## 2018-10-19 DIAGNOSIS — I10 BENIGN ESSENTIAL HYPERTENSION: ICD-10-CM

## 2018-10-19 DIAGNOSIS — G47.30 SLEEP APNEA, UNSPECIFIED TYPE: Primary | ICD-10-CM

## 2018-10-19 DIAGNOSIS — N52.9 ERECTILE DYSFUNCTION, UNSPECIFIED ERECTILE DYSFUNCTION TYPE: ICD-10-CM

## 2018-10-19 PROCEDURE — 99213 OFFICE O/P EST LOW 20 MIN: CPT | Performed by: FAMILY MEDICINE

## 2018-10-19 RX ORDER — SILDENAFIL 100 MG/1
100 TABLET, FILM COATED ORAL DAILY PRN
Qty: 6 TABLET | Refills: 1 | Status: SHIPPED | OUTPATIENT
Start: 2018-10-19 | End: 2019-04-04

## 2018-10-19 NOTE — MR AVS SNAPSHOT
After Visit Summary   10/19/2018    Arsenio Stockton    MRN: [id number]           Patient Information     Date Of Birth          1970        Visit Information        Provider Department      10/19/2018 11:00 AM Samuel Barrios MD [facility]        Today's Diagnoses     Sleep apnea, unspecified type    -  1    Erectile dysfunction, unspecified erectile dysfunction type        Benign essential hypertension          Care Instructions          Thank you for choosing [facility].  You may be receiving a survey in the mail from [facility] regarding your visit today.  Please take a few minutes to complete and return the survey to let us know how we are doing.      Our Clinic hours are:  Mondays    7:20 am - 7 pm  Tues -  Fri  7:20 am - 5 pm    Clinic Phone: [phone]    The clinic lab opens at 7:30 am Mon - Fri and appointments are required.    [facility]. [phone]  Monday  8 am - 7pm  Tues - Fri 8 am - 5:30 pm                 Follow-ups after your visit        Your next 10 appointments already scheduled     Oct 22, 2018  9:00 AM CDT   Return Sleep Patient with Devon Rodriguez MD   [facility] ([facility]    [address] [address]   [phone]            Oct 29, 2018 12:40 PM CDT   SHORT with RODRICK Bustillos CNP   [facility] ([facility])    [address] [address]   [phone]            Nov 09, 2018 10:20 AM CST   Return Visit with Lien Thomas PA-C   [facility] ([facility])    [address] [facility] [address]   [phone]              Who to contact     If you have questions or need follow up information about today's clinic visit or your schedule please contact [facility] directly at [phone].  Normal or non-critical lab and  "imaging results will be communicated to you by MyChart, letter or phone within 4 business days after the clinic has received the results. If you do not hear from us within 7 days, please contact the clinic through MyChart or phone. If you have a critical or abnormal lab result, we will notify you by phone as soon as possible.  Submit refill requests through [employer] or call your pharmacy and they will forward the refill request to us. Please allow 3 business days for your refill to be completed.          Additional Information About Your Visit        Care EveryWhere ID     This is your Care EveryWhere ID. This could be used by other organizations to access your [facility] medical records  [id number]        Your Vitals Were     Pulse Temperature Respirations Height Pulse Oximetry BMI (Body Mass Index)    74 98.3  F (36.8  C) 18 5' 8.5\" (1.74 m) 94% 39.71 kg/m2       Blood Pressure from Last 3 Encounters:   10/19/18 120/80   10/05/18 144/89   10/01/18 (!) 190/99    Weight from Last 3 Encounters:   10/19/18 265 lb (120.2 kg)   10/01/18 282 lb (127.9 kg)   05/08/17 281 lb (127.5 kg)              Today, you had the following     No orders found for display         Today's Medication Changes          These changes are accurate as of 10/19/18 11:46 AM.  If you have any questions, ask your nurse or doctor.               Start taking these medicines.        Dose/Directions    sildenafil 100 MG tablet   Commonly known as:  VIAGRA   Used for:  Erectile dysfunction, unspecified erectile dysfunction type   Started by:  Samuel Barrios MD        Dose:  100 mg   Take 1 tablet (100 mg) by mouth daily as needed 30 min to 4 hrs before sex. Do not use with nitroglycerin, terazosin or doxazosin.   Quantity:  6 tablet   Refills:  1            Where to get your medicines      These medications were sent to [facility] [id number] [address]    Hours:  AKA " Jessica Tatum Phone:  [phone]     sildenafil 100 MG tablet                Primary Care Provider Office Phone # Fax #    RODRICK Bustillos [phone] [address] [address] [address]        Equal Access to Services     JOHAN MENA : Hadii aad ku hadasho Soomaali, waaxda luqadaha, qaybta kaalmada adeegyada, waxay idiin hayaan adeorion kharash la'taton ah. So [facility] [phone].    ATENCIÓN: Si habla español, tiene a gibson disposición servicios gratuitos de asistencia lingüística. Mariahcaitie al [phone].    We comply with applicable federal civil rights laws and [place] laws. We do not discriminate on the basis of race, color, national origin, age, disability, sex, sexual orientation, or gender identity.            Thank you!     Thank you for choosing [facility]  for your care. Our goal is always to provide you with excellent care. Hearing back from our patients is one way we can continue to improve our services. Please take a few minutes to complete the written survey that you may receive in the mail after your visit with us. Thank you!             Your Updated Medication List - Protect others around you: Learn how to safely use, store and throw away your medicines at www.disposemymeds.org.          This list is accurate as of 10/19/18 11:46 AM.  Always use your most recent med list.                   Brand Name Dispense Instructions for use Diagnosis    cetirizine 10 MG tablet    zyrTEC    90 tablet    Take 1 tablet (10 mg) by mouth every evening    Chronic rhinitis       chlorthalidone 25 MG tablet    HYGROTON    90 tablet    Take 2 tablets (50 mg) by mouth daily    Benign essential hypertension       OMEPRAZOLE PO      Take 20 mg by mouth every morning        POTASSIUM CITRATE PO      Over the counter unsure of mg.        ranitidine 150 MG tablet    ZANTAC    60 tablet    Take 1 tablet (150 mg) by mouth 2 times daily    Gastroesophageal reflux  disease without esophagitis       sildenafil 100 MG tablet    VIAGRA    6 tablet    Take 1 tablet (100 mg) by mouth daily as needed 30 min to 4 hrs before sex. Do not use with nitroglycerin, terazosin or doxazosin.    Erectile dysfunction, unspecified erectile dysfunction type       triamcinolone 0.1 % ointment    KENALOG    30 g    Apply sparingly to affected area three times daily for 14 days.    Lichen simplex, chronic

## 2018-10-19 NOTE — PROGRESS NOTES
"  SUBJECTIVE:   Arsenio Stockton is a 47 year old male who presents to clinic today for the following health issues:      Hypertension Follow-up      Outpatient blood pressures are not being checked.    Low Salt Diet: no added salt      Amount of exercise or physical activity: 2-3 days/week for an average of 15-30 minutes    Problems taking medications regularly: No    Medication side effects: none    Diet: low salt            Problem list and histories reviewed & adjusted, as indicated.  Additional history:         Reviewed and updated as needed this visit by clinical staff  Tobacco  Med Hx  Surg Hx  Fam Hx  Soc Hx      Reviewed and updated as needed this visit by Provider      Further history obtained, clarified or corrected by physician:  He is mainly here today because of erectile dysfunction which has been coming on over the last few months.  He initially thought it may have been somewhat worse when he started his antihypertensive medication but now he thinks it was just progressing.  It is somewhat intermittent.  Also he has been diagnosed with sleep apnea but has been having trouble connecting with the sleep clinic to take the next step.  He would very much like to try CPAP if that is recommended.    OBJECTIVE:  /80  Pulse 74  Temp 98.3  F (36.8  C)  Resp 18  Ht 1.74 m (5' 8.5\")  Wt 120.2 kg (265 lb)  SpO2 94%  BMI 39.71 kg/m2  LUNGS: clear to auscultation, normal breath sounds  CV: RRR without murmur  ABD: BS+, soft, nontender, no masses, no hepatosplenomegaly  EXTREMITIES: without joint tenderness, swelling or erythema.  No muscle tenderness or abnormality.  SKIN: No rashes or abnormalities  NEURO:non focal exam    ASSESSMENT:     Sleep apnea, unspecified type  Erectile dysfunction, unspecified erectile dysfunction type  Benign essential hypertension    PLAN:  Trial of Viagra  Sleep medicine office is contacted and they will talk to him today here to get him set up for the next step.    Orders " Placed This Encounter     sildenafil (VIAGRA) 100 MG tablet

## 2018-10-19 NOTE — PATIENT INSTRUCTIONS
Thank you for choosing [facility].  You may be receiving a survey in the mail from [facility] regarding your visit today.  Please take a few minutes to complete and return the survey to let us know how we are doing.      Our Clinic hours are:  Mondays    7:20 am - 7 pm  Tues - Fri  7:20 am - 5 pm    Clinic Phone: [phone]    The clinic lab opens at 7:30 am Mon - Fri and appointments are required.    [facility]. [phone]  Monday  8 am - 7pm  Tues - Fri 8 am - 5:30 pm

## 2018-10-22 ENCOUNTER — OFFICE VISIT (OUTPATIENT)
Dept: SLEEP MEDICINE | Facility: CLINIC | Age: 48
End: 2018-10-22
Payer: COMMERCIAL

## 2018-10-22 VITALS
WEIGHT: 268 LBS | HEIGHT: 69 IN | SYSTOLIC BLOOD PRESSURE: 128 MMHG | BODY MASS INDEX: 39.69 KG/M2 | DIASTOLIC BLOOD PRESSURE: 84 MMHG | HEART RATE: 67 BPM | OXYGEN SATURATION: 97 %

## 2018-10-22 DIAGNOSIS — G47.33 OSA (OBSTRUCTIVE SLEEP APNEA): Primary | ICD-10-CM

## 2018-10-22 PROCEDURE — 99214 OFFICE O/P EST MOD 30 MIN: CPT | Performed by: FAMILY MEDICINE

## 2018-10-22 NOTE — PROGRESS NOTES
"    Chief Complaint   Patient presents with     Study Results     Discuss options for his sleep apnea. He has lost weight since his study in 2017       Arsenio Stockton is a 47 year old male who presents to discuss treatment options for moderate DEIRDRE with interim weight loss of ~15 lbs.    Pertinent PMHx of obesity, HTN, MDD.    HST performed 5/23/2017 with weight 281 lbs with AHI 17.6, baseline SpO2 92%, haydee SpO2 80%, time of SpO2 <= 88% of 76.3 minutes but is likely over-reported with presumed SpO2 artifact for last ~40 minutes of recording.  Presenting concerns were snoring, observed apnea, choking / gasping arousals.    Today, he returns due to interim ~15 lb weight loss and recommendation from Dr. Barrios.  Last few BP's have been within goal.  He also seems to deny any snoring, observed apnea, gasping arousals and seems to feel he is doing \"ok\".      Problem List:  Patient Active Problem List    Diagnosis Date Noted     Morbid obesity (H) 10/01/2018     Priority: Medium     Sleep apnea      Priority: Medium     GERD (gastroesophageal reflux disease)      Priority: Medium     Benign essential hypertension 03/27/2017     Priority: Medium     Elevated blood pressure reading without diagnosis of hypertension 03/06/2015     Priority: Medium     CARDIOVASCULAR SCREENING; LDL GOAL LESS THAN 160 02/06/2014     Priority: Medium        /84  Pulse 67  Ht 1.74 m (5' 8.5\")  Wt 121.6 kg (268 lb)  SpO2 97%  BMI 40.15 kg/m2    Impression/Plan:    1.)  Moderate DEIRDRE with sleep-associated hypoxemia   - Interim ~15 lb weight loss, reported clinical resolution of sxs with decrease in snoring / apnea / gasping arousals.   - Given this, suggests significant improvement in DEIRDRE with weight loss.  I would be comfortable with continuing with weight management as treatment for DEIRDRE.  If return of sxs or weight gain, could consider starting CPAP auto-titrate.       Twenty-five minutes spent with patient, all of which were spent " face-to-face counseling, consulting, coordinating plan of care.      Devon Rodriguez MD, MD    CC:  Lata Cabral (only for reference, does not automatically route).

## 2018-10-22 NOTE — NURSING NOTE
"Chief Complaint   Patient presents with     Study Results     Discuss options for his sleep apnea. He has lost weight since his study in 2017       Initial /84  Pulse 67  Ht 1.74 m (5' 8.5\")  Wt 121.6 kg (268 lb)  SpO2 97%  BMI 40.15 kg/m2 Estimated body mass index is 40.15 kg/(m^2) as calculated from the following:    Height as of this encounter: 1.74 m (5' 8.5\").    Weight as of this encounter: 121.6 kg (268 lb).    Medication Reconciliation: complete      "

## 2018-10-22 NOTE — PATIENT INSTRUCTIONS

## 2018-10-22 NOTE — MR AVS SNAPSHOT
After Visit Summary   10/22/2018    Arsenio Stockton    MRN: [id number]           Patient Information     Date Of Birth          1970        Visit Information        Provider Department      10/22/2018 9:00 AM Devon Rodriguez MD [facility]        Today's Diagnoses     DEIRDRE (obstructive sleep apnea)    -  1      Care Instructions      Your BMI is Body mass index is 40.15 kg/(m^2).  Weight management is a personal decision.  If you are interested in exploring weight loss strategies, the following discussion covers the approaches that may be successful. Body mass index (BMI) is one way to tell whether you are at a healthy weight, overweight, or obese. It measures your weight in relation to your height.  A BMI of 18.5 to 24.9 is in the healthy range. A person with a BMI of 25 to 29.9 is considered overweight, and someone with a BMI of 30 or greater is considered obese. More than two-thirds of American adults are considered overweight or obese.  Being overweight or obese increases the risk for further weight gain. Excess weight may lead to heart disease and diabetes.  Creating and following plans for healthy eating and physical activity may help you improve your health.  Weight control is part of healthy lifestyle and includes exercise, emotional health, and healthy eating habits. Careful eating habits lifelong are the mainstay of weight control. Though there are significant health benefits from weight loss, long-term weight loss with diet alone may be very difficult to achieve- studies show long-term success with dietary management in less than 10% of people. Attaining a healthy weight may be especially difficult to achieve in those with severe obesity. In some cases, medications, devices and surgical management might be considered.  What can you do?  If you are overweight or obese and are interested in methods for weight loss, you should discuss this with your provider.      Consider reducing daily calorie intake by 500 calories.     Keep a food journal.     Avoiding skipping meals, consider cutting portions instead.    Diet combined with exercise helps maintain muscle while optimizing fat loss. Strength training is particularly important for building and maintaining muscle mass. Exercise helps reduce stress, increase energy, and improves fitness. Increasing exercise without diet control, however, may not burn enough calories to loose weight.       Start walking three days a week 10-20 minutes at a time    Work towards walking thirty minutes five days a week     Eventually, increase the speed of your walking for 1-2 minutes at time    In addition, we recommend that you review healthy lifestyles and methods for weight loss available through the National Institutes of Health patient information sites:  http://win.niddk.nih.gov/publications/index.htm    And look into health and wellness programs that may be available through your health insurance provider, employer, local community center, or bijal club.    Weight management plan: Patient was referred to their PCP to discuss a diet and exercise plan.        Your Body mass index is 40.15 kg/(m^2).  Weight management is a personal decision.  If you are interested in exploring weight loss strategies, the following discussion covers the approaches that may be successful. Body mass index (BMI) is one way to tell whether you are at a healthy weight, overweight, or obese. It measures your weight in relation to your height.  A BMI of 18.5 to 24.9 is in the healthy range. A person with a BMI of 25 to 29.9 is considered overweight, and someone with a BMI of 30 or greater is considered obese. More than two-thirds of American adults are considered overweight or obese.  Being overweight or obese increases the risk for further weight gain. Excess weight may lead to heart disease and diabetes.  Creating and following plans for healthy eating and  physical activity may help you improve your health.  Weight control is part of healthy lifestyle and includes exercise, emotional health, and healthy eating habits. Careful eating habits lifelong are the mainstay of weight control. Though there are significant health benefits from weight loss, long-term weight loss with diet alone may be very difficult to achieve- studies show long-term success with dietary management in less than 10% of people. Attaining a healthy weight may be especially difficult to achieve in those with severe obesity. In some cases, medications, devices and surgical management might be considered.  What can you do?  If you are overweight or obese and are interested in methods for weight loss, you should discuss this with your provider.     Consider reducing daily calorie intake by 500 calories.     Keep a food journal.     Avoiding skipping meals, consider cutting portions instead.    Diet combined with exercise helps maintain muscle while optimizing fat loss. Strength training is particularly important for building and maintaining muscle mass. Exercise helps reduce stress, increase energy, and improves fitness. Increasing exercise without diet control, however, may not burn enough calories to loose weight.       Start walking three days a week 10-20 minutes at a time    Work towards walking thirty minutes five days a week     Eventually, increase the speed of your walking for 1-2 minutes at time    In addition, we recommend that you review healthy lifestyles and methods for weight loss available through the National Institutes of Health patient information sites:  http://win.niddk.nih.gov/publications/index.htm    And look into health and wellness programs that may be available through your health insurance provider, employer, local community center, or bijal club.    Weight management plan: Patient was referred to their PCP to discuss a diet and exercise plan.          Follow-ups after your  "visit        Follow-up notes from your care team     Return if symptoms worsen or fail to improve.      Your next 10 appointments already scheduled     Oct 29, 2018 12:40 PM CDT   SHORT with RODRICK Bustillos [facility]    [address]  [facility] [id number] [phone]            Nov 09, 2018 10:20 AM CST   Return Visit with Lien Thomas PA-C   [facility]    [address] [id number] [phone]              Who to contact     If you have questions or need follow up information about today's clinic visit or your schedule please contact [facility] directly at [phone].  Normal or non-critical lab and imaging results will be communicated to you by MyChart, letter or phone within 4 business days after the clinic has received the results. If you do not hear from us within 7 days, please contact the clinic through MyChart or phone. If you have a critical or abnormal lab result, we will notify you by phone as soon as possible.  Submit refill requests through [facility] or call your pharmacy and they will forward the refill request to us. Please allow 3 business days for your refill to be completed.          Additional Information About Your Visit        Care EveryWhere ID     This is your Care EveryWhere ID. This could be used by other organizations to access your [place] medical records  [id number]        Your Vitals Were     Pulse Height Pulse Oximetry BMI (Body Mass Index)          67 1.74 m (5' 8.5\") 97% 40.15 kg/m2         Blood Pressure from Last 3 Encounters:   10/22/18 128/84   10/19/18 120/80   10/05/18 144/89    Weight from Last 3 Encounters:   10/22/18 121.6 kg (268 lb)   10/19/18 120.2 kg (265 lb)   10/01/18 127.9 kg (282 lb)              Today, you had the following     No orders found for display       Primary Care Provider Office " Phone # Fax #    RODRICK Bustillos [phone] [address] [facility] [address]        Equal Access to Services     JOHAN MENA : Hadii julio ku hadtriciao Sojoelali, waaxda luqadaha, qaybta kaalmada adeegyada, irene shabazzn clive caal laJoshuakristen nesbitt. So [facility] [phone].    ATENCIÓN: Si habla español, tiene a gibson disposición servicios gratuitos de asistencia lingüística. Llame al [phone].    We comply with applicable federal civil rights laws and [place] laws. We do not discriminate on the basis of race, color, national origin, age, disability, sex, sexual orientation, or gender identity.            Thank you!     Thank you for choosing [facility]  for your care. Our goal is always to provide you with excellent care. Hearing back from our patients is one way we can continue to improve our services. Please take a few minutes to complete the written survey that you may receive in the mail after your visit with us. Thank you!             Your Updated Medication List - Protect others around you: Learn how to safely use, store and throw away your medicines at www.disposemymeds.org.          This list is accurate as of 10/22/18  9:24 AM.  Always use your most recent med list.                   Brand Name Dispense Instructions for use Diagnosis    cetirizine 10 MG tablet    zyrTEC    90 tablet    Take 1 tablet (10 mg) by mouth every evening    Chronic rhinitis       chlorthalidone 25 MG tablet    HYGROTON    90 tablet    Take 2 tablets (50 mg) by mouth daily    Benign essential hypertension       OMEPRAZOLE PO      Take 20 mg by mouth every morning        POTASSIUM CITRATE PO      Over the counter unsure of mg.        ranitidine 150 MG tablet    ZANTAC    60 tablet    Take 1 tablet (150 mg) by mouth 2 times daily    Gastroesophageal reflux disease without esophagitis       sildenafil 100 MG tablet    VIAGRA    6 tablet    Take 1 tablet (100 mg) by mouth daily  as needed 30 min to 4 hrs before sex. Do not use with nitroglycerin, terazosin or doxazosin.    Erectile dysfunction, unspecified erectile dysfunction type       triamcinolone 0.1 % ointment    KENALOG    30 g    Apply sparingly to affected area three times daily for 14 days.    Lichen simplex, chronic

## 2018-10-29 ENCOUNTER — OFFICE VISIT (OUTPATIENT)
Dept: FAMILY MEDICINE | Facility: CLINIC | Age: 48
End: 2018-10-29
Payer: COMMERCIAL

## 2018-10-29 VITALS
DIASTOLIC BLOOD PRESSURE: 80 MMHG | RESPIRATION RATE: 16 BRPM | SYSTOLIC BLOOD PRESSURE: 130 MMHG | BODY MASS INDEX: 38.21 KG/M2 | HEIGHT: 69 IN | TEMPERATURE: 98.7 F | HEART RATE: 72 BPM | WEIGHT: 258 LBS | OXYGEN SATURATION: 96 %

## 2018-10-29 DIAGNOSIS — G47.30 SLEEP APNEA, UNSPECIFIED TYPE: ICD-10-CM

## 2018-10-29 DIAGNOSIS — I10 BENIGN ESSENTIAL HYPERTENSION: Primary | ICD-10-CM

## 2018-10-29 DIAGNOSIS — E66.01 MORBID OBESITY (H): ICD-10-CM

## 2018-10-29 DIAGNOSIS — K21.9 GASTROESOPHAGEAL REFLUX DISEASE WITHOUT ESOPHAGITIS: ICD-10-CM

## 2018-10-29 PROCEDURE — 99214 OFFICE O/P EST MOD 30 MIN: CPT | Performed by: NURSE PRACTITIONER

## 2018-10-29 NOTE — MR AVS SNAPSHOT
After Visit Summary   10/29/2018    Arsenio Stockton    MRN: [id number]           Patient Information     Date Of Birth          1970        Visit Information        Provider Department      10/29/2018 12:40 PM Lata Cabral APRN CNP [facility]        Today's Diagnoses     Benign essential hypertension    -  1    Sleep apnea, unspecified type        Gastroesophageal reflux disease without esophagitis          Care Instructions    Continue with all medications daily.  Return to have staff recheck your blood pressure in one month and can weigh in then as well.  Follow up for labs and check up in April.      Thank you for choosing [facility] ([facility]  You may be receiving a survey in the mail from [employer] regarding your visit today.  Please take a few minutes to complete and return the survey to let us know how we are doing.      Our Clinic hours are:  Mondays    7:20 am - 7 pm  Tues -  Fri  7:20 am - 5 pm    Clinic Phone: [phone]    The clinic lab opens at 7:30 am Mon - Fri and appointments are required.    [facility]  Ph. [phone]  Monday  8 am - 7pm  Tues - Fri 8 am - 5:30 pm                 Follow-ups after your visit        Follow-up notes from your care team     Return in about 4 weeks (around 11/26/2018) for BP Recheck, or sooner if symptoms persist or worsen.      Your next 10 appointments already scheduled     Nov 09, 2018 10:20 AM CST   [facility]    [address] [facility] [address] [phone]              Who to contact     If you have questions or need follow up information about today's clinic visit or your schedule please contact [facility] directly at [phone].  Normal or non-critical lab and imaging results will be communicated to you by MyChart, letter or phone within 4 business days after the clinic  "has received the results. If you do not hear from us within 7 days, please contact the clinic through [facility] or phone. If you have a critical or abnormal lab result, we will notify you by phone as soon as possible.  Submit refill requests through [facility] or call your pharmacy and they will forward the refill request to us. Please allow 3 business days for your refill to be completed.          Additional Information About Your Visit        Care EveryWhere ID     This is your Care EveryWhere ID. This could be used by other organizations to access your [place] medical records  [id number]        Your Vitals Were     Pulse Temperature Respirations Height Pulse Oximetry BMI (Body Mass Index)    72 98.7  F (37.1  C) (Oral) 16 5' 8.5\" (1.74 m) 96% 38.66 kg/m2       Blood Pressure from Last 3 Encounters:   10/29/18 130/80   10/22/18 128/84   10/19/18 120/80    Weight from Last 3 Encounters:   10/29/18 258 lb (117 kg)   10/22/18 268 lb (121.6 kg)   10/19/18 265 lb (120.2 kg)              Today, you had the following     No orders found for display         Today's Medication Changes          These changes are accurate as of 10/29/18  1:03 PM.  If you have any questions, ask your nurse or doctor.               These medicines have changed or have updated prescriptions.        Dose/Directions    chlorthalidone 25 MG tablet   Commonly known as:  HYGROTON   This may have changed:  how much to take   Used for:  Benign essential hypertension        Dose:  50 mg   Take 2 tablets (50 mg) by mouth daily   Quantity:  90 tablet   Refills:  3                Primary Care Provider Office Phone # Fax #    RODRICK Bustillos -[phone] [address] [facility] [address]        Equal Access to Services     [facility] RAJESH AH: Amber Matute, oksana ragland, yinka kakim zamarripa, irene nesbitt. So [facility] [phone].    ATENCIÓN: Si kathleen owens " disposición servicios gratuitos de asistencia lingüística. Ashlyn proctor [phone].    We comply with applicable federal civil rights laws and [place] laws. We do not discriminate on the basis of race, color, national origin, age, disability, sex, sexual orientation, or gender identity.            Thank you!     Thank you for choosing [facility]  for your care. Our goal is always to provide you with excellent care. Hearing back from our patients is one way we can continue to improve our services. Please take a few minutes to complete the written survey that you may receive in the mail after your visit with us. Thank you!             Your Updated Medication List - Protect others around you: Learn how to safely use, store and throw away your medicines at www.disposemymeds.org.          This list is accurate as of 10/29/18  1:03 PM.  Always use your most recent med list.                   Brand Name Dispense Instructions for use Diagnosis    cetirizine 10 MG tablet    zyrTEC    90 tablet    Take 1 tablet (10 mg) by mouth every evening    Chronic rhinitis       chlorthalidone 25 MG tablet    HYGROTON    90 tablet    Take 2 tablets (50 mg) by mouth daily    Benign essential hypertension       OMEPRAZOLE PO      Take 20 mg by mouth every morning        POTASSIUM CITRATE PO      Over the counter unsure of mg.        ranitidine 150 MG tablet    ZANTAC    60 tablet    Take 1 tablet (150 mg) by mouth 2 times daily    Gastroesophageal reflux disease without esophagitis       sildenafil 100 MG tablet    VIAGRA    6 tablet    Take 1 tablet (100 mg) by mouth daily as needed 30 min to 4 hrs before sex. Do not use with nitroglycerin, terazosin or doxazosin.    Erectile dysfunction, unspecified erectile dysfunction type       triamcinolone 0.1 % ointment    KENALOG    30 g    Apply sparingly to affected area three times daily for 14 days.    Lichen simplex, chronic

## 2018-10-29 NOTE — PROGRESS NOTES
SUBJECTIVE:   Arsenio Stockton is a 47 year old male who presents to clinic today for the following health issues:  he has only been taking 25 mg of Hygroten    Hypertension Follow-up      Outpatient blood pressures are not being checked.    Low Salt Diet: no added salt      Amount of exercise or physical activity: just walking at work    Problems taking medications regularly: No    Medication side effects: none    Diet: Keto Diet            Problem list and histories reviewed & adjusted, as indicated.  Additional history: states he's been successful with over 20 pound weight loss. He's on the keto diet. He states last time he was in and saw morbid obesity on his chart, that scared him.  He states his sleep apnea is fine, no need for CPAP with recent weight loss. He is generally feeling pretty good.  He has been taking only one of the chlorthalidone daily. He didn't know he was to take two. His blood pressure has been good at his recent visits.      Patient Active Problem List   Diagnosis     CARDIOVASCULAR SCREENING; LDL GOAL LESS THAN 160     Elevated blood pressure reading without diagnosis of hypertension     Benign essential hypertension     Sleep apnea     Morbid obesity (H)     GERD (gastroesophageal reflux disease)     History reviewed. No pertinent surgical history.    Social History   Substance Use Topics     Smoking status: Former Smoker     Years: 3.00     Types: Dip, chew, snus or snuff     Smokeless tobacco: Current User     Types: Chew     Last attempt to quit: 1/8/2014     Alcohol use No     Family History   Problem Relation Age of Onset     Unknown/Adopted Maternal Grandmother      Unknown/Adopted Maternal Grandfather          Current Outpatient Prescriptions   Medication Sig Dispense Refill     cetirizine (ZYRTEC) 10 MG tablet Take 1 tablet (10 mg) by mouth every evening 90 tablet 1     chlorthalidone (HYGROTON) 25 MG tablet Take 2 tablets (50 mg) by mouth daily (Patient taking differently: Take 25  "mg by mouth daily ) 90 tablet 3     OMEPRAZOLE PO Take 20 mg by mouth every morning       POTASSIUM CITRATE PO Over the counter unsure of mg.       ranitidine (ZANTAC) 150 MG tablet Take 1 tablet (150 mg) by mouth 2 times daily 60 tablet 1     sildenafil (VIAGRA) 100 MG tablet Take 1 tablet (100 mg) by mouth daily as needed 30 min to 4 hrs before sex. Do not use with nitroglycerin, terazosin or doxazosin. 6 tablet 1     triamcinolone (KENALOG) 0.1 % ointment Apply sparingly to affected area three times daily for 14 days. 30 g 0     No Known Allergies  Recent Labs   Lab Test  10/01/18   1402  10/01/18   1401  04/10/17   1435  03/06/15   1440   A1C  5.5   --    --    --    LDL   --    --    --   90   ALT  37   --   46   --    CR  0.91   --   0.95  0.91   GFRESTIMATED  90   --   86  >90  Non  GFR Calc     GFRESTBLACK  >90   --   >90   GFR Calc    >90   GFR Calc     POTASSIUM  3.8   --   3.1*  3.8   TSH   --   0.68  0.39*   --       BP Readings from Last 3 Encounters:   10/29/18 130/80   10/22/18 128/84   10/19/18 120/80    Wt Readings from Last 3 Encounters:   10/29/18 258 lb (117 kg)   10/22/18 268 lb (121.6 kg)   10/19/18 265 lb (120.2 kg)                    Reviewed and updated as needed this visit by clinical staff  Tobacco  Allergies  Meds  Med Hx  Surg Hx  Fam Hx  Soc Hx      Reviewed and updated as needed this visit by Provider          ROS: 10 point ROS neg other than the symptoms noted above in the HPI.    OBJECTIVE:     /80 (BP Location: Right arm, Patient Position: Chair, Cuff Size: Adult Large)  Pulse 72  Temp 98.7  F (37.1  C) (Oral)  Resp 16  Ht 5' 8.5\" (1.74 m)  Wt 258 lb (117 kg)  SpO2 96%  BMI 38.66 kg/m2  Body mass index is 38.66 kg/(m^2).  GENERAL: healthy, alert and no distress  HENT: ear canals and TM's normal, pharynx without erythema  NECK: no adenopathy, no asymmetry  RESP: lungs clear to auscultation - no rales, rhonchi or " wheezes  CV: regular rate and rhythm, normal S1 S2, no S3 or S4, no murmur  ABDOMEN: soft, nontender  MS: no gross musculoskeletal defects noted      Diagnostic Test Results:  No results found for this or any previous visit (from the past 24 hour(s)).    ASSESSMENT/PLAN:             1. Benign essential hypertension    Can stay at the 25 mg of chlorthalidone daily and continue to monitor blood pressure.  Return for staff to recheck blood pressure.    2. Sleep apnea, unspecified type      3. Gastroesophageal reflux disease without esophagitis    Stable, no concerns.    4. Morbid obesity (H)    Congratulations on weight loss effort, keep up good work.       See Patient Instructions  Patient Instructions   Continue with all medications daily.  Return to have staff recheck your blood pressure in one month and can weigh in then as well.  Follow up for labs and check up in April.      Thank you for choosing [facility].  You may be receiving a survey in the mail from [employer] regarding your visit today.  Please take a few minutes to complete and return the survey to let us know how we are doing.      Our Clinic hours are:  Mondays    7:20 am - 7 pm  Tues -  Fri  7:20 am - 5 pm    Clinic Phone: [phone]    The clinic lab opens at 7:30 am Mon - Fri and appointments are required.    [facility] [place]  Ph. [phone]  Monday  8 am - 7pm  Tues - Fri 8 am - 5:30 pm             RODRICK Bustillos CNP  [facility]

## 2018-12-29 DIAGNOSIS — K21.9 GASTROESOPHAGEAL REFLUX DISEASE WITHOUT ESOPHAGITIS: ICD-10-CM

## 2018-12-31 NOTE — TELEPHONE ENCOUNTER
"Requested Prescriptions   Pending Prescriptions Disp Refills     ranitidine (ZANTAC) 150 MG tablet [Pharmacy Med Name: RANITIDINE  MG TABLET]  .Last Written Prescription Date:  10/1/2018  Last Fill Quantity: 60,  # refills: 1   Last office visit: 10/29/2018 with prescribing provider:  Misha   Future Office Visit:     60 tablet 1     Sig: Take 1 tablet (150 mg) by mouth 2 times daily    H2 Blockers Protocol Passed - 12/29/2018  8:00 AM       Passed - Patient is age 12 or older       Passed - Recent (12 mo) or future (30 days) visit within the authorizing provider's specialty    Patient had office visit in the last 12 months or has a visit in the next 30 days with authorizing provider or within the authorizing provider's specialty.  See \"Patient Info\" tab in inbasket, or \"Choose Columns\" in Meds & Orders section of the refill encounter.                "

## 2019-03-29 DIAGNOSIS — J31.0 CHRONIC RHINITIS: ICD-10-CM

## 2019-03-29 RX ORDER — CETIRIZINE HYDROCHLORIDE 10 MG/1
10 TABLET ORAL EVERY EVENING
Qty: 90 TABLET | Refills: 0 | Status: SHIPPED | OUTPATIENT
Start: 2019-03-29 | End: 2019-07-24

## 2019-03-29 NOTE — TELEPHONE ENCOUNTER
"Requested Prescriptions   Pending Prescriptions Disp Refills     cetirizine (ZYRTEC) 10 MG tablet [Pharmacy Med Name: CETIRIZINE HCL 10 MG TABLET] 90 tablet 1     Sig: Take 1 tablet (10 mg) by mouth every evening    Antihistamines Protocol Passed - 3/29/2019  7:59 AM       Passed - Patient is 3-64 years of age    Apply weight-based dosing for peds patients age 3 - 12 years of age.    Forward request to provider for patients under the age of 3 or over the age of 64.         Passed - Recent (12 mo) or future (30 days) visit within the authorizing provider's specialty    Patient had office visit in the last 12 months or has a visit in the next 30 days with authorizing provider or within the authorizing provider's specialty.  See \"Patient Info\" tab in inbasket, or \"Choose Columns\" in Meds & Orders section of the refill encounter.             Passed - Medication is active on med list        Last Written Prescription Date:  10/1/18  Last Fill Quantity: 90,  # refills: 1   Last office visit: 10/29/2018 with prescribing provider:  Misha   Future Office Visit:      "

## 2019-04-04 DIAGNOSIS — N52.9 ERECTILE DYSFUNCTION, UNSPECIFIED ERECTILE DYSFUNCTION TYPE: ICD-10-CM

## 2019-04-04 RX ORDER — SILDENAFIL 100 MG/1
100 TABLET, FILM COATED ORAL DAILY PRN
Qty: 6 TABLET | Refills: 1 | Status: SHIPPED | OUTPATIENT
Start: 2019-04-04 | End: 2019-08-14

## 2019-04-04 NOTE — TELEPHONE ENCOUNTER
"Requested Prescriptions   Pending Prescriptions Disp Refills     sildenafil (VIAGRA) 100 MG tablet [Pharmacy Med Name: SILDENAFIL CITRATE 100 MG TABLET] 6 tablet 1     Sig: Take 1 tablet (100 mg) by mouth daily as needed 30 min to 4 hrs before sex. Do not use with nitroglycerin, terazosin or doxazosin.    Erectile Dysfuction Protocol Passed - 4/4/2019  8:00 AM       Passed - Absence of nitrates on medication list       Passed - Absence of Alpha Blockers on Med list       Passed - Recent (12 mo) or future (30 days) visit within the authorizing provider's specialty    Patient had office visit in the last 12 months or has a visit in the next 30 days with authorizing provider or within the authorizing provider's specialty.  See \"Patient Info\" tab in inbasket, or \"Choose Columns\" in Meds & Orders section of the refill encounter.             Passed - Medication is active on med list       Passed - Patient is age 18 or older        Last Written Prescription Date:  10/19/18  Last Fill Quantity: 6,  # refills: 1   Last office visit: 10/29/2018 with prescribing provider:  Misha   Future Office Visit:      "

## 2019-05-31 DIAGNOSIS — K21.9 GASTROESOPHAGEAL REFLUX DISEASE WITHOUT ESOPHAGITIS: ICD-10-CM

## 2019-05-31 NOTE — TELEPHONE ENCOUNTER
"Requested Prescriptions   Pending Prescriptions Disp Refills     ranitidine (ZANTAC) 150 MG tablet [Pharmacy Med Name: RANITIDINE  MG TABLET] 60 tablet 1     Sig: Take 1 tablet (150 mg) by mouth 2 times daily       H2 Blockers Protocol Passed - 5/31/2019  8:00 AM        Passed - Patient is age 12 or older        Passed - Recent (12 mo) or future (30 days) visit within the authorizing provider's specialty     Patient had office visit in the last 12 months or has a visit in the next 30 days with authorizing provider or within the authorizing provider's specialty.  See \"Patient Info\" tab in inbasket, or \"Choose Columns\" in Meds & Orders section of the refill encounter.              Passed - Medication is active on med list        Last Written Prescription Date:  12/31/18  Last Fill Quantity: 60,  # refills: 1   Last office visit: 10/29/2018 with prescribing provider:     Future Office Visit:    '  "

## 2019-07-24 DIAGNOSIS — J31.0 CHRONIC RHINITIS: ICD-10-CM

## 2019-07-25 NOTE — TELEPHONE ENCOUNTER
"Requested Prescriptions   Pending Prescriptions Disp Refills     cetirizine (ZYRTEC) 10 MG tablet [Pharmacy Med Name: CETIRIZINE HCL 10 MG TABLET] 90 tablet 0     Sig: TAKE 1 TABLET BY MOUTH EVERY EVENING       Antihistamines Protocol Passed - 7/24/2019  4:36 PM        Passed - Patient is 3-64 years of age     Apply weight-based dosing for peds patients age 3 - 12 years of age.    Forward request to provider for patients under the age of 3 or over the age of 64.          Passed - Recent (12 mo) or future (30 days) visit within the authorizing provider's specialty     Patient had office visit in the last 12 months or has a visit in the next 30 days with authorizing provider or within the authorizing provider's specialty.  See \"Patient Info\" tab in inbasket, or \"Choose Columns\" in Meds & Orders section of the refill encounter.              Passed - Medication is active on med list        Last Written Prescription Date:  3/29/19  Last Fill Quantity: 90,  # refills: 0   Last office visit: 10/29/2018 with prescribing provider:  Misha   Future Office Visit:   Next 5 appointments (look out 90 days)    Jul 26, 2019  9:00 AM CDT  SHORT with RODRICK Bustillos CNP  [facility] [id number]  [facility] [id number] [phone]           "

## 2019-07-26 ENCOUNTER — OFFICE VISIT (OUTPATIENT)
Dept: FAMILY MEDICINE | Facility: CLINIC | Age: 49
End: 2019-07-26
Payer: COMMERCIAL

## 2019-07-26 ENCOUNTER — ANCILLARY PROCEDURE (OUTPATIENT)
Dept: GENERAL RADIOLOGY | Facility: CLINIC | Age: 49
End: 2019-07-26
Attending: NURSE PRACTITIONER
Payer: COMMERCIAL

## 2019-07-26 VITALS
DIASTOLIC BLOOD PRESSURE: 80 MMHG | HEART RATE: 76 BPM | TEMPERATURE: 97.3 F | RESPIRATION RATE: 18 BRPM | HEIGHT: 69 IN | OXYGEN SATURATION: 92 % | BODY MASS INDEX: 39.1 KG/M2 | SYSTOLIC BLOOD PRESSURE: 130 MMHG | WEIGHT: 264 LBS

## 2019-07-26 DIAGNOSIS — M25.511 RIGHT SHOULDER PAIN, UNSPECIFIED CHRONICITY: Primary | ICD-10-CM

## 2019-07-26 DIAGNOSIS — M25.511 RIGHT SHOULDER PAIN, UNSPECIFIED CHRONICITY: ICD-10-CM

## 2019-07-26 PROCEDURE — 99214 OFFICE O/P EST MOD 30 MIN: CPT | Performed by: NURSE PRACTITIONER

## 2019-07-26 PROCEDURE — 73030 X-RAY EXAM OF SHOULDER: CPT | Mod: RT

## 2019-07-26 RX ORDER — CETIRIZINE HYDROCHLORIDE 10 MG/1
TABLET ORAL
Qty: 90 TABLET | Refills: 1 | Status: SHIPPED | OUTPATIENT
Start: 2019-07-26 | End: 2020-03-03

## 2019-07-26 ASSESSMENT — PATIENT HEALTH QUESTIONNAIRE - PHQ9: SUM OF ALL RESPONSES TO PHQ QUESTIONS 1-9: 3

## 2019-07-26 ASSESSMENT — PAIN SCALES - GENERAL: PAINLEVEL: SEVERE PAIN (6)

## 2019-07-26 ASSESSMENT — MIFFLIN-ST. JEOR: SCORE: 2059.94

## 2019-07-26 NOTE — TELEPHONE ENCOUNTER
Prescription approved per [facility] Refill Protocol.  Lucy ALSTON RN

## 2019-07-26 NOTE — PROGRESS NOTES
"Subjective     Arsenio Stockton is a 48 year old male who presents to clinic today for the following health issues:    HPI   Joint Pain    Onset: about 2 mo     Description:   Location: right shoulder  Character: Sharp, Dull ache and Stabbing    Intensity: moderate, severe    Progression of Symptoms: worse    Accompanying Signs & Symptoms:  Other symptoms: numbness and tingling    History:   Previous similar pain: YES- years ago       Precipitating factors:   Trauma or overuse: YES- lifting     Alleviating factors:  Improved by: NSAID - small amount of relief     Therapies Tried and outcome: ice  And heat  - no relief       Workman's comp issue. He works at [employer] in [employer]. Reports lifting a toilet about 2 months ago and when he turned to the left he felt his right shoulder \"pop\"  He told his supervisor the next day but never sought medical attention for this. Denies previous shoulder problems. Since that event, he's had slowly worsening right shoulder pain and numbness.    Patient Active Problem List   Diagnosis     CARDIOVASCULAR SCREENING; LDL GOAL LESS THAN 160     Elevated blood pressure reading without diagnosis of hypertension     Benign essential hypertension     Sleep apnea     Morbid obesity (H)     GERD (gastroesophageal reflux disease)     History reviewed. No pertinent surgical history.    Social History     Tobacco Use     Smoking status: Former Smoker     Years: 3.00     Types: Dip, chew, snus or snuff     Smokeless tobacco: Current User     Types: Chew     Last attempt to quit: 1/8/2014   Substance Use Topics     Alcohol use: No     Family History   Problem Relation Age of Onset     Unknown/Adopted Maternal Grandmother      Unknown/Adopted Maternal Grandfather          Current Outpatient Medications   Medication Sig Dispense Refill     cetirizine (ZYRTEC) 10 MG tablet Take 1 tablet (10 mg) by mouth every evening 90 tablet 0     chlorthalidone (HYGROTON) 25 MG tablet Take 2 tablets (50 mg) by " "mouth daily (Patient taking differently: Take 25 mg by mouth daily ) 90 tablet 3     OMEPRAZOLE PO Take 20 mg by mouth every morning       POTASSIUM CITRATE PO Over the counter unsure of mg.       ranitidine (ZANTAC) 150 MG tablet Take 1 tablet (150 mg) by mouth 2 times daily 60 tablet 4     sildenafil (VIAGRA) 100 MG tablet Take 1 tablet (100 mg) by mouth daily as needed 30 min to 4 hrs before sex. Do not use with nitroglycerin, terazosin or doxazosin. 6 tablet 1     triamcinolone (KENALOG) 0.1 % ointment Apply sparingly to affected area three times daily for 14 days. 30 g 0     No Known Allergies  BP Readings from Last 3 Encounters:   07/26/19 130/80   10/29/18 130/80   10/22/18 128/84    Wt Readings from Last 3 Encounters:   07/26/19 119.7 kg (264 lb)   10/29/18 117 kg (258 lb)   10/22/18 121.6 kg (268 lb)                    Reviewed and updated as needed this visit by Provider         Review of Systems   ROS COMP: Constitutional, HEENT, cardiovascular, pulmonary, gi and gu systems are negative, except as otherwise noted.      Objective    /80   Pulse 76   Temp 97.3  F (36.3  C)   Resp 18   Ht 1.74 m (5' 8.5\")   Wt 119.7 kg (264 lb)   SpO2 92%   BMI 39.56 kg/m    Body mass index is 39.56 kg/m .  Physical Exam   GENERAL: healthy, alert and no distress  NECK: no asymmetry, FROM of neck without pain or limitation  RESP: lungs clear to auscultation - no rales, rhonchi or wheezes  CV: regular rate and rhythm, nela  MS: no gross musculoskeletal defects noted, no pain with palpation or obvious dislocation of right shoulder, decreased overhead and lateral raise of shoulder      Diagnostic Test Results:  Labs reviewed in Epic  No results found for this or any previous visit (from the past 24 hour(s)).        Assessment & Plan     1. Right shoulder pain, unspecified chronicity    - XR Shoulder Right G/E 3 Views; Future  - PHYSICAL THERAPY REFERRAL; Future     BMI:   Estimated body mass index is 39.56 kg/m  as " "calculated from the following:    Height as of this encounter: 1.74 m (5' 8.5\").    Weight as of this encounter: 119.7 kg (264 lb).           See Patient Instructions  Patient Instructions   Will be notified of pending x ray results.  Start PT and if no improvement, will go ahead with MRI.  Continue with ice or heat and ibuprofen as needed. Avoid sleep on that side.      Our Clinic hours are:  Mondays    7:20 am - 7 pm  Tues -  Fri  7:20 am - 5 pm    Clinic Phone: [phone]    The clinic lab opens at 7:30 am Mon - Fri and appointments are required.    [facility]  Ph. [phone]  Monday  8 am - 7pm  Tues - Fri 8 am - 5:30 pm             Return in about 1 week (around 8/2/2019) for or sooner if symptoms persist or worsen.    RODRICK Bustillos CNP  [facility]    "

## 2019-07-26 NOTE — PATIENT INSTRUCTIONS
Will be notified of pending x ray results.  Start PT and if no improvement, will go ahead with MRI.  Continue with ice or heat and ibuprofen as needed. Avoid sleep on that side.      Our Clinic hours are:  Mondays    7:20 am - 7 pm  Tues -  Fri  7:20 am - 5 pm    Clinic Phone: [phone]    The clinic lab opens at 7:30 am Mon - Fri and appointments are required.    [facility]  Ph. [phone]  Monday  8 am - 7pm  Tues - Fri 8 am - 5:30 pm

## 2019-08-14 DIAGNOSIS — N52.9 ERECTILE DYSFUNCTION, UNSPECIFIED ERECTILE DYSFUNCTION TYPE: ICD-10-CM

## 2019-08-14 RX ORDER — SILDENAFIL 100 MG/1
100 TABLET, FILM COATED ORAL DAILY PRN
Qty: 6 TABLET | Refills: 1 | Status: SHIPPED | OUTPATIENT
Start: 2019-08-14 | End: 2019-11-26

## 2019-08-14 NOTE — TELEPHONE ENCOUNTER
"Requested Prescriptions   Pending Prescriptions Disp Refills     sildenafil (VIAGRA) 100 MG tablet [Pharmacy Med Name: SILDENAFIL CITRATE 100 MG TABLET] 6 tablet 1     Sig: Take 1 tablet (100 mg) by mouth daily as needed 30 min to 4 hrs before sex. Do not use with nitroglycerin, terazosin or doxazosin.   Last Written Prescription Date:  4/4/19  Last Fill Quantity: 6 tab,  # refills: 1   Last office visit: 7/26/2019 with prescribing provider:  Lata Cabral     Future Office Visit:        Erectile Dysfuction Protocol Passed - 8/14/2019  8:00 AM        Passed - Absence of nitrates on medication list        Passed - Absence of Alpha Blockers on Med list        Passed - Recent (12 mo) or future (30 days) visit within the authorizing provider's specialty     Patient had office visit in the last 12 months or has a visit in the next 30 days with authorizing provider or within the authorizing provider's specialty.  See \"Patient Info\" tab in inbasket, or \"Choose Columns\" in Meds & Orders section of the refill encounter.              Passed - Medication is active on med list        Passed - Patient is age 18 or older          "

## 2019-09-24 DIAGNOSIS — I10 BENIGN ESSENTIAL HYPERTENSION: ICD-10-CM

## 2019-09-24 NOTE — TELEPHONE ENCOUNTER
"Pharmacy note:  Patient misplaced last refill and needs this authorized   Requested Prescriptions   Pending Prescriptions Disp Refills     chlorthalidone (HYGROTON) 25 MG tablet 90 tablet 3     Sig: Take 2 tablets (50 mg) by mouth daily       Diuretics (Including Combos) Protocol Passed - 9/24/2019 10:02 AM        Passed - Blood pressure under 140/90 in past 12 months     BP Readings from Last 3 Encounters:   07/26/19 130/80   10/29/18 130/80   10/22/18 128/84                 Passed - Recent (12 mo) or future (30 days) visit within the authorizing provider's specialty     Patient had office visit in the last 12 months or has a visit in the next 30 days with authorizing provider or within the authorizing provider's specialty.  See \"Patient Info\" tab in inbasket, or \"Choose Columns\" in Meds & Orders section of the refill encounter.              Passed - Medication is active on med list        Passed - Patient is age 18 or older        Passed - Normal serum creatinine on file in past 12 months     Recent Labs   Lab Test 10/01/18  1402   CR 0.91              Passed - Normal serum potassium on file in past 12 months     Recent Labs   Lab Test 10/01/18  1402   POTASSIUM 3.8                    Passed - Normal serum sodium on file in past 12 months     Recent Labs   Lab Test 10/01/18  1402                 Last Written Prescription Date:  10/1/2018  Last Fill Quantity: 90,  # refills: 3  Last office visit: 7/26/2019 with prescribing provider:  Misha  Future Office Visit:      "

## 2019-09-25 RX ORDER — CHLORTHALIDONE 25 MG/1
50 TABLET ORAL DAILY
Qty: 90 TABLET | Refills: 1 | Status: SHIPPED | OUTPATIENT
Start: 2019-09-25 | End: 2019-12-27

## 2019-09-25 NOTE — TELEPHONE ENCOUNTER
Prescription approved per [facility] Refill Protocol.  [email or web address]

## 2019-11-08 ENCOUNTER — HEALTH MAINTENANCE LETTER (OUTPATIENT)
Age: 49
End: 2019-11-08

## 2019-11-26 DIAGNOSIS — N52.9 ERECTILE DYSFUNCTION, UNSPECIFIED ERECTILE DYSFUNCTION TYPE: ICD-10-CM

## 2019-11-26 RX ORDER — SILDENAFIL 100 MG/1
100 TABLET, FILM COATED ORAL DAILY PRN
Qty: 6 TABLET | Refills: 1 | Status: SHIPPED | OUTPATIENT
Start: 2019-11-26 | End: 2020-06-29

## 2019-11-26 NOTE — TELEPHONE ENCOUNTER
"Requested Prescriptions   Pending Prescriptions Disp Refills     sildenafil (VIAGRA) 100 MG tablet [Pharmacy Med Name: SILDENAFIL CITRATE 100 MG TABLET] 6 tablet 1     Sig: Take 1 tablet (100 mg) by mouth daily as needed 30 min to 4 hrs before sex. Do not use with nitroglycerin, terazosin or doxazosin.       Erectile Dysfuction Protocol Passed - 11/26/2019  8:30 AM        Passed - Absence of nitrates on medication list        Passed - Absence of Alpha Blockers on Med list        Passed - Recent (12 mo) or future (30 days) visit within the authorizing provider's specialty     Patient has had an office visit with the authorizing provider or a provider within the authorizing providers department within the previous 12 mos or has a future within next 30 days. See \"Patient Info\" tab in inbasket, or \"Choose Columns\" in Meds & Orders section of the refill encounter.              Passed - Medication is active on med list        Passed - Patient is age 18 or older        Last Written Prescription Date:  8/14/2019  Last Fill Quantity: 6,  # refills: 1   Last office visit: 7/26/2019 with prescribing provider:  Misha   Future Office Visit:      "

## 2019-12-27 ENCOUNTER — TELEPHONE (OUTPATIENT)
Dept: FAMILY MEDICINE | Facility: CLINIC | Age: 49
End: 2019-12-27

## 2019-12-27 DIAGNOSIS — I10 BENIGN ESSENTIAL HYPERTENSION: ICD-10-CM

## 2019-12-27 RX ORDER — CHLORTHALIDONE 25 MG/1
25 TABLET ORAL DAILY
Qty: 30 TABLET | Refills: 0 | Status: SHIPPED | OUTPATIENT
Start: 2019-12-27 | End: 2020-02-19

## 2019-12-27 NOTE — TELEPHONE ENCOUNTER
Medication is being filled for 1 time refill only due to:  Patient needs labs ordered BMP.     Left message for patient to call us back - needs lab only appointment.    Sadie Andrade RN

## 2019-12-30 DIAGNOSIS — I10 BENIGN ESSENTIAL HYPERTENSION: ICD-10-CM

## 2019-12-30 LAB
ANION GAP SERPL CALCULATED.3IONS-SCNC: 6 MMOL/L (ref 3–14)
BUN SERPL-MCNC: 13 MG/DL (ref 7–30)
CALCIUM SERPL-MCNC: 8.7 MG/DL (ref 8.5–10.1)
CHLORIDE SERPL-SCNC: 104 MMOL/L (ref 94–109)
CO2 SERPL-SCNC: 30 MMOL/L (ref 20–32)
CREAT SERPL-MCNC: 1.01 MG/DL (ref 0.66–1.25)
GFR SERPL CREATININE-BSD FRML MDRD: 88 ML/MIN/{1.73_M2}
GLUCOSE SERPL-MCNC: 99 MG/DL (ref 70–99)
POTASSIUM SERPL-SCNC: 3.7 MMOL/L (ref 3.4–5.3)
SODIUM SERPL-SCNC: 140 MMOL/L (ref 133–144)

## 2019-12-30 PROCEDURE — 80048 BASIC METABOLIC PNL TOTAL CA: CPT | Performed by: NURSE PRACTITIONER

## 2019-12-30 PROCEDURE — 36415 COLL VENOUS BLD VENIPUNCTURE: CPT | Performed by: NURSE PRACTITIONER

## 2020-02-18 DIAGNOSIS — I10 BENIGN ESSENTIAL HYPERTENSION: ICD-10-CM

## 2020-02-18 NOTE — TELEPHONE ENCOUNTER
"Requested Prescriptions   Pending Prescriptions Disp Refills     chlorthalidone (HYGROTON) 25 MG tablet [Pharmacy Med Name: CHLORTHALIDONE 25 MG TABLET] 30 tablet 0     Sig: TAKE 1 TABLET BY MOUTH EVERY DAY ** YOUR PROVIDER HAS ASKED YOU TO MAKE AN APPOINTMENT FOR MORE FILLS**       Diuretics (Including Combos) Protocol Passed - 2/18/2020  8:34 AM        Passed - Blood pressure under 140/90 in past 12 months     BP Readings from Last 3 Encounters:   07/26/19 130/80   10/29/18 130/80   10/22/18 128/84                 Passed - Recent (12 mo) or future (30 days) visit within the authorizing provider's specialty     Patient has had an office visit with the authorizing provider or a provider within the authorizing providers department within the previous 12 mos or has a future within next 30 days. See \"Patient Info\" tab in inbasket, or \"Choose Columns\" in Meds & Orders section of the refill encounter.              Passed - Medication is active on med list        Passed - Patient is age 18 or older        Passed - Normal serum creatinine on file in past 12 months     Recent Labs   Lab Test 12/30/19  1000   CR 1.01              Passed - Normal serum potassium on file in past 12 months     Recent Labs   Lab Test 12/30/19  1000   POTASSIUM 3.7                    Passed - Normal serum sodium on file in past 12 months     Recent Labs   Lab Test 12/30/19  1000                 Last Written Prescription Date:  12/27/19  Last Fill Quantity: 30,  # refills: 0   Last office visit: 7/26/2019 with prescribing provider:  Misha   Future Office Visit:      "

## 2020-02-19 RX ORDER — CHLORTHALIDONE 25 MG/1
25 TABLET ORAL DAILY
Qty: 90 TABLET | Refills: 2 | Status: SHIPPED | OUTPATIENT
Start: 2020-02-19 | End: 2021-01-14

## 2020-03-01 DIAGNOSIS — J31.0 CHRONIC RHINITIS: ICD-10-CM

## 2020-03-02 NOTE — TELEPHONE ENCOUNTER
"Requested Prescriptions   Pending Prescriptions Disp Refills     cetirizine (ZYRTEC) 10 MG tablet [Pharmacy Med Name: CETIRIZINE HCL 10 MG TABLET]  Last Written Prescription Date:  7/26/19  Last Fill Quantity: 90,  # refills: 1   Last Office Visit with [facility], [facility] or [facility] prescribing provider:  7/26/19   Future Office Visit:      90 tablet 1     Sig: TAKE 1 TABLET BY MOUTH EVERY EVENING       Antihistamines Protocol Passed - 3/1/2020  8:00 AM        Passed - Patient is 3-64 years of age     Apply weight-based dosing for peds patients age 3 - 12 years of age.    Forward request to provider for patients under the age of 3 or over the age of 64.          Passed - Recent (12 mo) or future (30 days) visit within the authorizing provider's specialty     Patient has had an office visit with the authorizing provider or a provider within the authorizing providers department within the previous 12 mos or has a future within next 30 days. See \"Patient Info\" tab in inbasket, or \"Choose Columns\" in Meds & Orders section of the refill encounter.              Passed - Medication is active on med list          "

## 2020-03-03 RX ORDER — CETIRIZINE HYDROCHLORIDE 10 MG/1
TABLET ORAL
Qty: 90 TABLET | Refills: 0 | Status: SHIPPED | OUTPATIENT
Start: 2020-03-03 | End: 2020-08-11

## 2020-06-08 ENCOUNTER — TELEPHONE (OUTPATIENT)
Dept: FAMILY MEDICINE | Facility: CLINIC | Age: 50
End: 2020-06-08

## 2020-06-08 DIAGNOSIS — K21.9 GASTROESOPHAGEAL REFLUX DISEASE WITHOUT ESOPHAGITIS: Primary | ICD-10-CM

## 2020-06-08 RX ORDER — FAMOTIDINE 20 MG/1
20 TABLET, FILM COATED ORAL 2 TIMES DAILY
Qty: 180 TABLET | Refills: 0 | Status: SHIPPED | OUTPATIENT
Start: 2020-06-08 | End: 2021-05-18

## 2020-06-08 NOTE — TELEPHONE ENCOUNTER
Reason for Call:  Prescription Issue    Detailed comments: Per fax from [facility] - All forms of Ranitidine are now recalled - Send new Rx for Famotidine.     Phone Number Pharmacy can be reached at: Other phone number:  [phone]    Best Time: any    Can we leave a detailed message on this number? YES    Call taken on 6/8/2020 at 12:52 PM by Sarah Page

## 2020-06-27 DIAGNOSIS — N52.9 ERECTILE DYSFUNCTION, UNSPECIFIED ERECTILE DYSFUNCTION TYPE: ICD-10-CM

## 2020-06-29 DIAGNOSIS — K21.9 GASTROESOPHAGEAL REFLUX DISEASE WITHOUT ESOPHAGITIS: ICD-10-CM

## 2020-06-29 RX ORDER — SILDENAFIL 100 MG/1
100 TABLET, FILM COATED ORAL DAILY PRN
Qty: 6 TABLET | Refills: 0 | Status: SHIPPED | OUTPATIENT
Start: 2020-06-29 | End: 2021-01-14

## 2020-06-29 NOTE — TELEPHONE ENCOUNTER
"Medication is being filled for 1 time refill only due to:  Needs appoinment for future orders.        Requested Prescriptions   Pending Prescriptions Disp Refills     sildenafil (VIAGRA) 100 MG tablet [Pharmacy Med Name: sildenafil 100 mg tablet] 6 tablet 1     Sig: Take 1 tablet (100 mg) by mouth daily as needed 30 min to 4 hrs before sex. Do not use with nitroglycerin, terazosin or doxazosin.       Erectile Dysfuction Protocol Passed - 6/27/2020 10:49 AM        Passed - Absence of nitrates on medication list        Passed - Absence of Alpha Blockers on Med list        Passed - Recent (12 mo) or future (30 days) visit within the authorizing provider's specialty     Patient has had an office visit with the authorizing provider or a provider within the authorizing providers department within the previous 12 mos or has a future within next 30 days. See \"Patient Info\" tab in inbasket, or \"Choose Columns\" in Meds & Orders section of the refill encounter.              Passed - Medication is active on med list        Passed - Patient is age 18 or older           CONCEPCION Lebron    "

## 2020-06-30 DIAGNOSIS — K21.9 GASTROESOPHAGEAL REFLUX DISEASE WITHOUT ESOPHAGITIS: ICD-10-CM

## 2020-06-30 DIAGNOSIS — K21.9 GASTROESOPHAGEAL REFLUX DISEASE WITHOUT ESOPHAGITIS: Primary | ICD-10-CM

## 2020-06-30 RX ORDER — FAMOTIDINE 40 MG/1
40 TABLET, FILM COATED ORAL DAILY
Qty: 90 TABLET | Refills: 0 | Status: SHIPPED | OUTPATIENT
Start: 2020-06-30 | End: 2020-10-20 | Stop reason: DRUGHIGH

## 2020-06-30 NOTE — TELEPHONE ENCOUNTER
To pharmacy: Ranitidine is unavailable.  Can we get an alternative?  This is not a refill request.

## 2020-06-30 NOTE — TELEPHONE ENCOUNTER
Routing refill request to provider for review/approval because:  Drug not on the [facility] refill protocol     Requested Prescriptions   Pending Prescriptions Disp Refills     ranitidine (ZANTAC) 300 MG tablet [Pharmacy Med Name: ranitidine 300 mg tablet] 30 tablet 1     Sig: TAKE 1/2 TABLET BY MOUTH TWICE DAILY       There is no refill protocol information for this order        Last Written Prescription Date:  4/14/2020  Last Fill Quantity: 30,  # refills: 1   Last office visit: 7/26/2019 with prescribing provider:  Misha MENSAH   Future Office Visit:      Gisela LONGORIA RN, BSN

## 2020-08-10 DIAGNOSIS — J31.0 CHRONIC RHINITIS: ICD-10-CM

## 2020-08-11 RX ORDER — CETIRIZINE HYDROCHLORIDE 10 MG/1
TABLET ORAL
Qty: 30 TABLET | Refills: 0 | Status: SHIPPED | OUTPATIENT
Start: 2020-08-11 | End: 2020-10-20

## 2020-08-11 NOTE — TELEPHONE ENCOUNTER
"Requested Prescriptions   Pending Prescriptions Disp Refills     cetirizine (ZYRTEC) 10 MG tablet [Pharmacy Med Name: cetirizine 10 mg tablet] 90 tablet 0     Sig: TAKE 1 TABLET BY MOUTH EVERY EVENING       Antihistamines Protocol Failed - 8/10/2020  8:11 AM        Failed - Recent (12 mo) or future (30 days) visit within the authorizing provider's specialty     Patient has had an office visit with the authorizing provider or a provider within the authorizing providers department within the previous 12 mos or has a future within next 30 days. See \"Patient Info\" tab in inbasket, or \"Choose Columns\" in Meds & Orders section of the refill encounter.              Passed - Patient is 3-64 years of age     Apply weight-based dosing for peds patients age 3 - 12 years of age.    Forward request to provider for patients under the age of 3 or over the age of 64.          Passed - Medication is active on med list             "

## 2020-09-21 DIAGNOSIS — J31.0 CHRONIC RHINITIS: ICD-10-CM

## 2020-09-21 NOTE — TELEPHONE ENCOUNTER
"Requested Prescriptions   Pending Prescriptions Disp Refills     cetirizine (ZYRTEC) 10 MG tablet [Pharmacy Med Name: cetirizine 10 mg tablet] 30 tablet 0     Sig: TAKE 1 TABLET BY MOUTH EVERY EVENING ** YOUR PROVIDER HAS ASKED YOU TO MAKE AN APPOINTMENT FOR MORE FILLS**       Antihistamines Protocol Failed - 9/21/2020  8:36 AM        Failed - Recent (12 mo) or future (30 days) visit within the authorizing provider's specialty     Patient has had an office visit with the authorizing provider or a provider within the authorizing providers department within the previous 12 mos or has a future within next 30 days. See \"Patient Info\" tab in inbasket, or \"Choose Columns\" in Meds & Orders section of the refill encounter.              Passed - Patient is 3-64 years of age     Apply weight-based dosing for peds patients age 3 - 12 years of age.    Forward request to provider for patients under the age of 3 or over the age of 64.          Passed - Medication is active on med list             "

## 2020-09-22 RX ORDER — CETIRIZINE HYDROCHLORIDE 10 MG/1
TABLET ORAL
Qty: 30 TABLET | Refills: 0 | OUTPATIENT
Start: 2020-09-22

## 2020-10-20 ENCOUNTER — VIRTUAL VISIT (OUTPATIENT)
Dept: FAMILY MEDICINE | Facility: CLINIC | Age: 50
End: 2020-10-20
Payer: COMMERCIAL

## 2020-10-20 DIAGNOSIS — J31.0 CHRONIC RHINITIS: ICD-10-CM

## 2020-10-20 DIAGNOSIS — E66.01 MORBID OBESITY (H): ICD-10-CM

## 2020-10-20 PROCEDURE — 99213 OFFICE O/P EST LOW 20 MIN: CPT | Mod: 95 | Performed by: NURSE PRACTITIONER

## 2020-10-20 RX ORDER — CETIRIZINE HYDROCHLORIDE 10 MG/1
TABLET ORAL
Qty: 90 TABLET | Refills: 1 | Status: SHIPPED | OUTPATIENT
Start: 2020-10-20 | End: 2021-06-03

## 2020-10-20 NOTE — PROGRESS NOTES
"Arsenio Stockton is a 49 year old male who is being evaluated via a billable telephone visit.      The patient has been notified of following:     \"This telephone visit will be conducted via a call between you and your physician/provider. We have found that certain health care needs can be provided without the need for a physical exam.  This service lets us provide the care you need with a short phone conversation.  If a prescription is necessary we can send it directly to your pharmacy.  If lab work is needed we can place an order for that and you can then stop by our lab to have the test done at a later time.    Telephone visits are billed at different rates depending on your insurance coverage. During this emergency period, for some insurers they may be billed the same as an in-person visit.  Please reach out to your insurance provider with any questions.    If during the course of the call the physician/provider feels a telephone visit is not appropriate, you will not be charged for this service.\"    Patient has given verbal consent for Telephone visit?  Yes    What phone number would you like to be contacted at? [phone]    How would you like to obtain your AVS? Lisa Kim     Arsenio Stockton is a 49 year old male who presents via phone visit today for the following health issues:    HPI     allergies    How many servings of fruits and vegetables do you eat daily?  2-3    On average, how many sweetened beverages do you drink each day (Examples: soda, juice, sweet tea, etc.  Do NOT count diet or artificially sweetened beverages)?   1-2    How many days per week do you exercise enough to make your heart beat faster? 4    How many minutes a day do you exercise enough to make your heart beat faster? 30 - 60  How many days per week do you miss taking your medication? 7    What makes it hard for you to take your medications?  ran out             Review of Systems   Constitutional, HEENT, cardiovascular, " pulmonary, gi and gu systems are negative, except as otherwise noted.       Objective          Vitals:  No vitals were obtained today due to virtual visit.    healthy, alert and no distress  PSYCH: Alert and oriented times 3; coherent speech, normal   rate and volume, able to articulate logical thoughts, able   to abstract reason, no tangential thoughts, no hallucinations   or delusions  His affect is normal  RESP: No cough, no audible wheezing, able to talk in full sentences  Remainder of exam unable to be completed due to telephone visits            Assessment/Plan:    Assessment & Plan     Chronic rhinitis    - cetirizine (ZYRTEC) 10 MG tablet; TAKE 1 TABLET BY MOUTH EVERY EVENING    Morbid obesity (H)            See Patient Instructions  Patient Instructions   Continue with Zyrtec for allergies.  Follow up if symptoms persist or worsen.  You are due for face to face visit in clinic for check up and lab work, can call to schedule that.        Our Clinic hours are:  Mondays    7:20 am - 7 pm  Tues -  Fri  7:20 am - 5 pm    Clinic Phone: [phone]    The clinic lab opens at 7:30 am Mon - Fri and appointments are required.    [facility]  Ph. [phone]  Monday  8 am - 7pm  Tues - Fri 8 am - 5:30 pm             No follow-ups on file.    RODRICK Bustillos [facility]    Phone call duration:  11 minutes

## 2020-10-20 NOTE — PATIENT INSTRUCTIONS
Continue with Zyrtec for allergies.  Follow up if symptoms persist or worsen.  You are due for face to face visit in clinic for check up and lab work, can call to schedule that.        Our Clinic hours are:  Mondays    7:20 am - 7 pm  Tues -  Fri  7:20 am - 5 pm    Clinic Phone: [phone]    The clinic lab opens at 7:30 am Mon - Fri and appointments are required.    [facility]  Ph. [phone]  Monday  8 am - 7pm  Tues - Fri 8 am - 5:30 pm

## 2020-12-06 ENCOUNTER — HEALTH MAINTENANCE LETTER (OUTPATIENT)
Age: 50
End: 2020-12-06

## 2021-01-10 ENCOUNTER — TELEPHONE (OUTPATIENT)
Dept: FAMILY MEDICINE | Facility: CLINIC | Age: 51
End: 2021-01-10

## 2021-01-10 DIAGNOSIS — I10 BENIGN ESSENTIAL HYPERTENSION: ICD-10-CM

## 2021-01-10 NOTE — LETTER
[facility]  [address]  [facility] [address]  Phone: [phone]       January 14, 2021         Arsenio Stockton  [address]  [facility] [address]            Dear Arsenio:    We are concerned about your health care.  We recently provided you with medication refills.  Many medications require routine follow-up with your doctor.    Please call to schedule an appointment before your next refills are needed.    Thank you,      Lata Cabral NP/ Briseida Do RN

## 2021-01-11 NOTE — TELEPHONE ENCOUNTER
"Requested Prescriptions   Pending Prescriptions Disp Refills     chlorthalidone (HYGROTON) 25 MG tablet [Pharmacy Med Name: chlorthalidone 25 mg tablet] 90 tablet 2     Sig: Take 1 tablet (25 mg) by mouth daily       Diuretics (Including Combos) Protocol Failed - 1/10/2021  8:00 AM        Failed - Blood pressure under 140/90 in past 12 months     BP Readings from Last 3 Encounters:   07/26/19 130/80   10/29/18 130/80   10/22/18 128/84                 Failed - Normal serum creatinine on file in past 12 months     Recent Labs   Lab Test 12/30/19  1000   CR 1.01              Failed - Normal serum potassium on file in past 12 months     Recent Labs   Lab Test 12/30/19  1000   POTASSIUM 3.7                    Failed - Normal serum sodium on file in past 12 months     Recent Labs   Lab Test 12/30/19  1000                 Passed - Recent (12 mo) or future (30 days) visit within the authorizing provider's specialty     Patient has had an office visit with the authorizing provider or a provider within the authorizing providers department within the previous 12 mos or has a future within next 30 days. See \"Patient Info\" tab in inbasket, or \"Choose Columns\" in Meds & Orders section of the refill encounter.              Passed - Medication is active on med list        Passed - Patient is age 18 or older             "

## 2021-01-13 DIAGNOSIS — N52.9 ERECTILE DYSFUNCTION, UNSPECIFIED ERECTILE DYSFUNCTION TYPE: ICD-10-CM

## 2021-01-13 NOTE — TELEPHONE ENCOUNTER
"Requested Prescriptions   Pending Prescriptions Disp Refills     sildenafil (VIAGRA) 100 MG tablet [Pharmacy Med Name: sildenafil 100 mg tablet] 6 tablet 0     Sig: Take 1 tablet (100 mg) by mouth daily as needed 30 min to 4 hrs before sex. Do not use with nitroglycerin, terazosin or doxazosin.       Erectile Dysfuction Protocol Passed - 1/13/2021  8:35 AM        Passed - Absence of nitrates on medication list        Passed - Absence of Alpha Blockers on Med list        Passed - Recent (12 mo) or future (30 days) visit within the authorizing provider's specialty     Patient has had an office visit with the authorizing provider or a provider within the authorizing providers department within the previous 12 mos or has a future within next 30 days. See \"Patient Info\" tab in inbasket, or \"Choose Columns\" in Meds & Orders section of the refill encounter.              Passed - Medication is active on med list        Passed - Patient is age 18 or older             "

## 2021-01-14 RX ORDER — CHLORTHALIDONE 25 MG/1
25 TABLET ORAL DAILY
Qty: 30 TABLET | Refills: 0 | Status: SHIPPED | OUTPATIENT
Start: 2021-01-14 | End: 2021-07-27

## 2021-01-14 RX ORDER — SILDENAFIL 100 MG/1
100 TABLET, FILM COATED ORAL DAILY PRN
Qty: 6 TABLET | Refills: 0 | Status: SHIPPED | OUTPATIENT
Start: 2021-01-14 | End: 2021-04-08

## 2021-01-14 NOTE — TELEPHONE ENCOUNTER
Routing refill request to provider for review/approval because:  Please review, Dr. Barrios originally prescribed back in 2018.  Are you ok taking over med?    Routing to provider.  Lucy ALSTON MSN, RN

## 2021-01-14 NOTE — TELEPHONE ENCOUNTER
Routing refill request to provider for review/approval because:  Labs not current:  Cr, potassium    Lucy ALSTON MSN, RN

## 2021-01-14 NOTE — TELEPHONE ENCOUNTER
Unable to reach patient by phone. Message said that number is not accepting calls at this time. Letter sent.  Briseida Do RN

## 2021-04-08 DIAGNOSIS — N52.9 ERECTILE DYSFUNCTION, UNSPECIFIED ERECTILE DYSFUNCTION TYPE: ICD-10-CM

## 2021-04-08 RX ORDER — SILDENAFIL 100 MG/1
100 TABLET, FILM COATED ORAL DAILY PRN
Qty: 6 TABLET | Refills: 0 | Status: SHIPPED | OUTPATIENT
Start: 2021-04-08 | End: 2021-07-27

## 2021-04-27 DIAGNOSIS — I10 BENIGN ESSENTIAL HYPERTENSION: ICD-10-CM

## 2021-04-27 NOTE — LETTER
[facility]  [address] [facility] [address]  [phone]        05/04/21    Arsenio Stockton    [address] [place] [address]            Dear Arsenio Stockton       APPOINTMENT REMINDER:   Our records indicates that it is time for you to be seen for a recheck.     Your current medication request will be approved for one refill but you will need to be seen before any additional refills can be approved.    Taking care of your health is important to us, and ongoing visits with your provider are vital to your care.    We look forward to seeing you in the near future.  You may call our office at [phone] to schedule a visit.    Please disregard this notice if you have already made an appointment.          Sincerely,        CANDY Mckenzie/shoshana

## 2021-04-27 NOTE — LETTER
[facility]  [address] [facility] [id number] [phone]        05/04/21    Arsenio Stockton    [address] [place] [address]            Dear Arsenio Stockton       APPOINTMENT REMINDER:   Our records indicates that it is time for you to be seen for a recheck.     Your current medication request cannot be approved at this time.    Taking care of your health is important to us, and ongoing visits with your provider are vital to your care.    We look forward to seeing you in the near future.  You may call our office at [phone] to schedule a visit.    Please disregard this notice if you have already made an appointment.          Sincerely,        CANDY Mckenzie/shoshana

## 2021-04-29 NOTE — TELEPHONE ENCOUNTER
Routing refill request to provider for review/approval because:  Labs not current:    Sodium   Date Value Ref Range Status   12/30/2019 140 133 - 144 mmol/L Final     Potassium   Date Value Ref Range Status   12/30/2019 3.7 3.4 - 5.3 mmol/L Final     Creatinine   Date Value Ref Range Status   12/30/2019 1.01 0.66 - 1.25 mg/dL Final     Sadie Andrade RN

## 2021-05-01 RX ORDER — CHLORTHALIDONE 25 MG/1
25 TABLET ORAL DAILY
Qty: 30 TABLET | Refills: 0 | OUTPATIENT
Start: 2021-05-01

## 2021-05-18 DIAGNOSIS — K21.9 GASTROESOPHAGEAL REFLUX DISEASE WITHOUT ESOPHAGITIS: ICD-10-CM

## 2021-05-18 RX ORDER — FAMOTIDINE 20 MG/1
20 TABLET, FILM COATED ORAL 2 TIMES DAILY
Qty: 180 TABLET | Refills: 2 | Status: SHIPPED | OUTPATIENT
Start: 2021-05-18 | End: 2021-07-27

## 2021-06-01 DIAGNOSIS — J31.0 CHRONIC RHINITIS: ICD-10-CM

## 2021-06-03 RX ORDER — CETIRIZINE HYDROCHLORIDE 10 MG/1
TABLET ORAL
Qty: 90 TABLET | Refills: 0 | Status: SHIPPED | OUTPATIENT
Start: 2021-06-03 | End: 2021-12-08

## 2021-07-27 ENCOUNTER — OFFICE VISIT (OUTPATIENT)
Dept: FAMILY MEDICINE | Facility: CLINIC | Age: 51
End: 2021-07-27
Payer: COMMERCIAL

## 2021-07-27 VITALS
BODY MASS INDEX: 43.04 KG/M2 | WEIGHT: 284 LBS | DIASTOLIC BLOOD PRESSURE: 80 MMHG | HEIGHT: 68 IN | HEART RATE: 69 BPM | SYSTOLIC BLOOD PRESSURE: 138 MMHG | RESPIRATION RATE: 16 BRPM | OXYGEN SATURATION: 96 % | TEMPERATURE: 98.3 F

## 2021-07-27 DIAGNOSIS — K21.9 GASTROESOPHAGEAL REFLUX DISEASE WITHOUT ESOPHAGITIS: ICD-10-CM

## 2021-07-27 DIAGNOSIS — R22.9 LUMPS ON THE SKIN: ICD-10-CM

## 2021-07-27 DIAGNOSIS — Z11.59 ENCOUNTER FOR HEPATITIS C SCREENING TEST FOR LOW RISK PATIENT: ICD-10-CM

## 2021-07-27 DIAGNOSIS — E66.01 MORBID OBESITY (H): ICD-10-CM

## 2021-07-27 DIAGNOSIS — Z11.4 SCREENING FOR HIV (HUMAN IMMUNODEFICIENCY VIRUS): ICD-10-CM

## 2021-07-27 DIAGNOSIS — Z13.1 SCREENING FOR DIABETES MELLITUS: ICD-10-CM

## 2021-07-27 DIAGNOSIS — Z13.220 SCREENING FOR LIPOID DISORDERS: ICD-10-CM

## 2021-07-27 DIAGNOSIS — N52.9 ERECTILE DYSFUNCTION, UNSPECIFIED ERECTILE DYSFUNCTION TYPE: ICD-10-CM

## 2021-07-27 DIAGNOSIS — I10 BENIGN ESSENTIAL HYPERTENSION: ICD-10-CM

## 2021-07-27 DIAGNOSIS — Z00.00 ROUTINE GENERAL MEDICAL EXAMINATION AT A HEALTH CARE FACILITY: Primary | ICD-10-CM

## 2021-07-27 LAB
ANION GAP SERPL CALCULATED.3IONS-SCNC: 7 MMOL/L (ref 3–14)
BUN SERPL-MCNC: 17 MG/DL (ref 7–30)
CALCIUM SERPL-MCNC: 8.9 MG/DL (ref 8.5–10.1)
CHLORIDE BLD-SCNC: 108 MMOL/L (ref 94–109)
CHOLEST SERPL-MCNC: 180 MG/DL
CO2 SERPL-SCNC: 25 MMOL/L (ref 20–32)
CREAT SERPL-MCNC: 0.91 MG/DL (ref 0.66–1.25)
FASTING STATUS PATIENT QL REPORTED: NO
GFR SERPL CREATININE-BSD FRML MDRD: >90 ML/MIN/1.73M2
GLUCOSE BLD-MCNC: 84 MG/DL (ref 70–99)
HDLC SERPL-MCNC: 54 MG/DL
LDLC SERPL CALC-MCNC: 97 MG/DL
NONHDLC SERPL-MCNC: 126 MG/DL
POTASSIUM BLD-SCNC: 3.7 MMOL/L (ref 3.4–5.3)
SODIUM SERPL-SCNC: 140 MMOL/L (ref 133–144)
TRIGL SERPL-MCNC: 144 MG/DL

## 2021-07-27 PROCEDURE — 86803 HEPATITIS C AB TEST: CPT | Performed by: NURSE PRACTITIONER

## 2021-07-27 PROCEDURE — 80061 LIPID PANEL: CPT | Performed by: NURSE PRACTITIONER

## 2021-07-27 PROCEDURE — 36415 COLL VENOUS BLD VENIPUNCTURE: CPT | Performed by: NURSE PRACTITIONER

## 2021-07-27 PROCEDURE — 80048 BASIC METABOLIC PNL TOTAL CA: CPT | Performed by: NURSE PRACTITIONER

## 2021-07-27 PROCEDURE — 99214 OFFICE O/P EST MOD 30 MIN: CPT | Mod: 25 | Performed by: NURSE PRACTITIONER

## 2021-07-27 PROCEDURE — 87389 HIV-1 AG W/HIV-1&-2 AB AG IA: CPT | Performed by: NURSE PRACTITIONER

## 2021-07-27 PROCEDURE — 99396 PREV VISIT EST AGE 40-64: CPT | Performed by: NURSE PRACTITIONER

## 2021-07-27 RX ORDER — SILDENAFIL 100 MG/1
100 TABLET, FILM COATED ORAL DAILY PRN
Qty: 6 TABLET | Refills: 0 | Status: SHIPPED | OUTPATIENT
Start: 2021-07-27 | End: 2021-12-08

## 2021-07-27 RX ORDER — FAMOTIDINE 20 MG/1
20 TABLET, FILM COATED ORAL 2 TIMES DAILY
Qty: 180 TABLET | Refills: 3 | Status: SHIPPED | OUTPATIENT
Start: 2021-07-27 | End: 2022-09-20

## 2021-07-27 RX ORDER — CHLORTHALIDONE 25 MG/1
25 TABLET ORAL DAILY
Qty: 90 TABLET | Refills: 3 | Status: SHIPPED | OUTPATIENT
Start: 2021-07-27 | End: 2022-08-27

## 2021-07-27 ASSESSMENT — MIFFLIN-ST. JEOR: SCORE: 2132.72

## 2021-07-27 NOTE — PROGRESS NOTES
3  SUBJECTIVE:   CC: Arsenio Stockton is an 50 year old male who presents for preventive health visit.       Patient has been advised of split billing requirements and indicates understanding: Yes  Healthy Habits:    Do you get at least three servings of calcium containing foods daily (dairy, green leafy vegetables, etc.)? yes    Amount of exercise or daily activities, outside of work: 0 day(s) per week    Problems taking medications regularly No    Medication side effects: No    Have you had an eye exam in the past two years? no    Do you see a dentist twice per year? no    Do you have sleep apnea, excessive snoring or daytime drowsiness?no      Hypertension Follow-up      Do you check your blood pressure regularly outside of the clinic? No     Are you following a low salt diet? Yes    Are your blood pressures ever more than 140 on the top number (systolic) OR more   than 90 on the bottom number (diastolic), for example 140/90? No      Today's PHQ-2 Score:   PHQ-2 ( 1999 [employer]) 7/27/2021 10/20/2020   Q1: Little interest or pleasure in doing things 0 0   Q2: Feeling down, depressed or hopeless 0 0   PHQ-2 Score 0 0       Abuse: Current or Past(Physical, Sexual or Emotional)- No  Do you feel safe in your environment? Yes    Have you ever done Advance Care Planning? (For example, a Health Directive, POLST, or a discussion with a medical provider or your loved ones about your wishes): No, advance care planning information given to patient to review.  Patient plans to discuss their wishes with loved ones or provider.      Social History     Tobacco Use     Smoking status: Former Smoker     Years: 3.00     Types: Dip, chew, snus or snuff     Smokeless tobacco: Current User     Types: Chew     Last attempt to quit: 1/8/2014   Substance Use Topics     Alcohol use: Not Currently     If you drink alcohol do you typically have >3 drinks per day or >7 drinks per week? No                      Last PSA: No results found for:  PSA    Reviewed orders with patient. Reviewed health maintenance and updated orders accordingly - Yes  BP Readings from Last 3 Encounters:   07/27/21 138/80   07/26/19 130/80   10/29/18 130/80    Wt Readings from Last 3 Encounters:   07/27/21 128.8 kg (284 lb)   07/26/19 119.7 kg (264 lb)   10/29/18 117 kg (258 lb)                  Patient Active Problem List   Diagnosis     CARDIOVASCULAR SCREENING; LDL GOAL LESS THAN 160     Elevated blood pressure reading without diagnosis of hypertension     Benign essential hypertension     Sleep apnea     Morbid obesity (H)     GERD (gastroesophageal reflux disease)     History reviewed. No pertinent surgical history.    Social History     Tobacco Use     Smoking status: Former Smoker     Years: 3.00     Types: Dip, chew, snus or snuff     Smokeless tobacco: Current User     Types: Chew     Last attempt to quit: 1/8/2014   Substance Use Topics     Alcohol use: Not Currently     Family History   Problem Relation Age of Onset     Unknown/Adopted Maternal Grandmother      Unknown/Adopted Maternal Grandfather          Current Outpatient Medications   Medication Sig Dispense Refill     cetirizine (ZYRTEC) 10 MG tablet TAKE 1 TABLET BY MOUTH EVERY EVENING 90 tablet 0     chlorthalidone (HYGROTON) 25 MG tablet Take 1 tablet (25 mg) by mouth daily 90 tablet 3     famotidine (PEPCID) 20 MG tablet Take 1 tablet (20 mg) by mouth 2 times daily 180 tablet 3     POTASSIUM CITRATE PO Take 20 mEq by mouth daily Over the counter unsure of mg.       sildenafil (VIAGRA) 100 MG tablet Take 1 tablet (100 mg) by mouth daily as needed (ED) 30 min to 4 hrs before sex. Do not use with nitroglycerin, terazosin or doxazosin. 6 tablet 0     triamcinolone (KENALOG) 0.1 % ointment Apply sparingly to affected area three times daily for 14 days. (Patient not taking: Reported on 7/27/2021) 30 g 0     No Known Allergies  Recent Labs   Lab Test 12/30/19  1000 10/01/18  1402 10/01/18  1401 04/10/17  1435  "03/06/15  1440   A1C  --  5.5  --   --   --    LDL  --   --   --   --  90   ALT  --  37  --  46  --    CR 1.01 0.91  --  0.95 0.91   GFRESTIMATED 88 90  --  86 >90  Non  GFR Calc     GFRESTBLACK >90 >90  --  >90   GFR Calc   >90   GFR Calc     POTASSIUM 3.7 3.8  --  3.1* 3.8   TSH  --   --  0.68 0.39*  --         Reviewed and updated as needed this visit by clinical staff  Tobacco  Allergies  Meds   Med Hx  Surg Hx  Fam Hx  Soc Hx        Reviewed and updated as needed this visit by Provider                Past Medical History:   Diagnosis Date     GERD (gastroesophageal reflux disease)      Hypertension      Sleep apnea       History reviewed. No pertinent surgical history.    ROS:  CONSTITUTIONAL: NEGATIVE for fever, chills, change in weight  INTEGUMENTARY/SKIN: NEGATIVE for worrisome rashes, moles or lesions  EYES: NEGATIVE for vision changes or irritation  ENT: NEGATIVE for ear, mouth and throat problems  RESP: NEGATIVE for significant cough or SOB  CV: NEGATIVE for chest pain, palpitations or peripheral edema  GI: NEGATIVE for nausea, abdominal pain, heartburn, or change in bowel habits   male: negative for dysuria, hematuria, decreased urinary stream, erectile dysfunction, urethral discharge  MUSCULOSKELETAL: NEGATIVE for significant arthralgias or myalgia  NEURO: NEGATIVE for weakness, dizziness or paresthesias  PSYCHIATRIC: NEGATIVE for changes in mood or affect    OBJECTIVE:   /80   Pulse 69   Temp 98.3  F (36.8  C)   Resp 16   Ht 1.727 m (5' 8\")   Wt 128.8 kg (284 lb)   SpO2 96%   BMI 43.18 kg/m    EXAM:  GENERAL: healthy, alert and no distress  EYES: Eyes grossly normal to inspection and conjunctivae and sclerae normal  HENT: ear canals and TM's normal, nose and mouth without ulcers or lesions  NECK: no adenopathy, no asymmetry, masses, or scars and thyroid normal to palpation  RESP: lungs clear to auscultation - no rales, rhonchi or " wheezes  CV: regular rate and rhythm  ABDOMEN: soft, nontender  MS: no gross musculoskeletal defects noted, no edema  SKIN: round, sound, mobile lump behind right ear   NEURO: Normal strength and tone, mentation intact and speech normal  PSYCH: mentation appears normal, affect normal/bright    Diagnostic Test Results:  Labs reviewed in Epic  No results found for this or any previous visit (from the past 24 hour(s)).    ASSESSMENT/PLAN:   1. Routine general medical examination at a health care facility      2. Gastroesophageal reflux disease without esophagitis    - famotidine (PEPCID) 20 MG tablet; Take 1 tablet (20 mg) by mouth 2 times daily  Dispense: 180 tablet; Refill: 3  Controlled continue with pepcid.    3. Morbid obesity (H)    He will contemplate resuming Keto diet.      4. Erectile dysfunction, unspecified erectile dysfunction type    - sildenafil (VIAGRA) 100 MG tablet; Take 1 tablet (100 mg) by mouth daily as needed (ED) 30 min to 4 hrs before sex. Do not use with nitroglycerin, terazosin or doxazosin.  Dispense: 6 tablet; Refill: 0    5. Benign essential hypertension    - chlorthalidone (HYGROTON) 25 MG tablet; Take 1 tablet (25 mg) by mouth daily  Dispense: 90 tablet; Refill: 3  - Basic metabolic panel  (Ca, Cl, CO2, Creat, Gluc, K, Na, BUN); Future  - Basic metabolic panel  (Ca, Cl, CO2, Creat, Gluc, K, Na, BUN)  Controlled, continue with medications and monitoring.    6. Screening for lipoid disorders    - Lipid panel reflex to direct LDL Fasting; Future  - Lipid panel reflex to direct LDL Fasting    7. Screening for diabetes mellitus    - Basic metabolic panel  (Ca, Cl, CO2, Creat, Gluc, K, Na, BUN); Future  - Basic metabolic panel  (Ca, Cl, CO2, Creat, Gluc, K, Na, BUN)    8. Screening for HIV (human immunodeficiency virus)    - HIV Antigen Antibody Combo; Future  - HIV Antigen Antibody Combo    9. Encounter for hepatitis C screening test for low risk patient    - Hepatitis C antibody; Future  -  "Hepatitis C antibody    10. Lumps on the skin    - Adult General Surg Referral; Future  Will obtain surgical consult for lump behind right ear which appears to be larger than last year; patient states it's the same size.      COUNSELING:  Reviewed preventive health counseling, as reflected in patient instructions       Regular exercise       Healthy diet/nutrition       Consider Hep C screening for all patients one time for ages 18-79 years       HIV screeninx in teen years, 1x in adult years, and at intervals if high risk       Advance Care Planning    Estimated body mass index is 43.18 kg/m  as calculated from the following:    Height as of this encounter: 1.727 m (5' 8\").    Weight as of this encounter: 128.8 kg (284 lb).    Weight management plan: Discussed healthy diet and exercise guidelines    He reports that he has quit smoking. His smoking use included dip, chew, snus or snuff. He quit after 3.00 years of use. His smokeless tobacco use includes chew.      Counseling Resources:  ATP IV Guidelines  Pooled Cohorts Equation Calculator  FRAX Risk Assessment  ICSI Preventive Guidelines  Dietary Guidelines for Americans, 2010  USDA's MyPlate  ASA Prophylaxis  Lung CA Screening    Lata Cabral, RODRICK CNP  [facility]  "

## 2021-07-27 NOTE — PATIENT INSTRUCTIONS
Will be notified of pending labs.  Follow up with surgeon for evaluation of cyst on neck.    Preventive Health Recommendations  Male Ages 40 to 49    Yearly exam:             See your health care provider every year in order to  o   Review health changes.   o   Discuss preventive care.    o   Review your medicines if your doctor has prescribed any.    You should be tested each year for STDs (sexually transmitted diseases) if you re at risk.     Have a cholesterol test every 5 years.     Have a colonoscopy (test for colon cancer) if someone in your family has had colon cancer or polyps before age 50.     After age 45, have a diabetes test (fasting glucose). If you are at risk for diabetes, you should have this test every 3 years.      Talk with your health care provider about whether or not a prostate cancer screening test (PSA) is right for you.    Shots: Get a flu shot each year. Get a tetanus shot every 10 years.     Nutrition:    Eat at least 5 servings of fruits and vegetables daily.     Eat whole-grain bread, whole-wheat pasta and brown rice instead of white grains and rice.     Get adequate Calcium and Vitamin D.     Lifestyle    Exercise for at least 150 minutes a week (30 minutes a day, 5 days a week). This will help you control your weight and prevent disease.     Limit alcohol to one drink per day.     No smoking.     Wear sunscreen to prevent skin cancer.     See your dentist every six months for an exam and cleaning.

## 2021-07-28 LAB
HCV AB SERPL QL IA: NONREACTIVE
HIV 1+2 AB+HIV1 P24 AG SERPL QL IA: NONREACTIVE

## 2021-07-29 ENCOUNTER — OFFICE VISIT (OUTPATIENT)
Dept: SURGERY | Facility: CLINIC | Age: 51
End: 2021-07-29
Payer: COMMERCIAL

## 2021-07-29 VITALS — HEART RATE: 52 BPM | RESPIRATION RATE: 16 BRPM | DIASTOLIC BLOOD PRESSURE: 92 MMHG | SYSTOLIC BLOOD PRESSURE: 153 MMHG

## 2021-07-29 DIAGNOSIS — R22.9 LUMPS ON THE SKIN: ICD-10-CM

## 2021-07-29 PROCEDURE — 99202 OFFICE O/P NEW SF 15 MIN: CPT | Performed by: SURGERY

## 2021-07-29 NOTE — NURSING NOTE
"Initial BP (!) 153/92 (BP Location: Right arm, Patient Position: Chair, Cuff Size: Adult Regular)   Pulse 52   Resp 16  Estimated body mass index is 43.18 kg/m  as calculated from the following:    Height as of 7/27/21: 1.727 m (5' 8\").    Weight as of 7/27/21: 128.8 kg (284 lb). .    Patient is here for a consult for lump lump behind right ear.  wanda bowers LPN    "

## 2021-07-29 NOTE — LETTER
7/29/2021         RE: Arsenio Stock[place]  [address] [facility] [address]        Dear Colleague,    Thank you for referring your patient, Arsenio Stockton, to the [facility]. Please see a copy of my visit note below.    Mr. Stockton has an enlarging right posterior auricular cystic mass.  It is mildly tender.  He desires excision.  No time available in clinic today, will be scheduled at next time conducive to his schedule.        Again, thank you for allowing me to participate in the care of your patient.        Sincerely,        Howard Roche, DO

## 2021-07-29 NOTE — PROGRESS NOTES
Mr. Stockton has an enlarging right posterior auricular cystic mass.  It is mildly tender.  He desires excision.  No time available in clinic today, will be scheduled at next time conducive to his schedule.

## 2021-08-12 ENCOUNTER — OFFICE VISIT (OUTPATIENT)
Dept: SURGERY | Facility: CLINIC | Age: 51
End: 2021-08-12
Payer: COMMERCIAL

## 2021-08-12 VITALS — TEMPERATURE: 98.2 F | DIASTOLIC BLOOD PRESSURE: 84 MMHG | HEART RATE: 76 BPM | SYSTOLIC BLOOD PRESSURE: 128 MMHG

## 2021-08-12 DIAGNOSIS — R22.0 MASS OF POSTAURICULAR AREA: Primary | ICD-10-CM

## 2021-08-12 PROCEDURE — 88305 TISSUE EXAM BY PATHOLOGIST: CPT | Performed by: PATHOLOGY

## 2021-08-12 PROCEDURE — 21552 EXC NECK LES SC 3 CM/>: CPT | Performed by: SURGERY

## 2021-08-12 NOTE — NURSING NOTE
"Chief Complaint   Patient presents with     Minor Procedure     Cyst removal- behind right ear        Initial /84 (BP Location: Right arm, Patient Position: Chair, Cuff Size: Adult Large)   Pulse 76   Temp 98.2  F (36.8  C) (Tympanic)  Estimated body mass index is 43.18 kg/m  as calculated from the following:    Height as of 7/27/21: 1.727 m (5' 8\").    Weight as of 7/27/21: 128.8 kg (284 lb).  BP completed using cuff size: large   Medications and allergies reviewed.      Nancy KURTZ CMA     "

## 2021-08-12 NOTE — LETTER
8/12/2021         RE: Arsenio Stockton  [address] [place] [address]        Dear Colleague,    Thank you for referring your patient, Arsenio Stockton, to the [facility]. Please see a copy of my visit note below.    Procedure Date: 08/12/21     PREOPERATIVE DIAGNOSIS: 1. Left posterior auricular mass  POSTOPERATIVE DIAGNOSIS: Same     PROCEDURE: Excision of right posterior auricular mass     ATTENDING SURGEON: Howard Roche DO    ESTIMATED BLOOD LOSS: Minimal    ANESTHESIA: Local Anesthesia     INDICATIONS FOR PROCEDURE: : Arsenio Stockton presents with a history of an enlarging, tender right posterior auricular mass which has been slowly growing and causes discomfort at times. After discussion was held with the patient regarding indications, risks, benefits and alternatives, benefits, and risks, his questions were addressed and he understood and wished to proceed. Specific risks discussed included bleeding, infection, seroma, need for additional treatment, nontherapeutic intervention, wound complication (such as dehiscence), recurrence, and rare complications related to surgery and/or anesthesia such as venous thromboembolism and cardiorespiratory complications.     PROCEDURE: After informed consent was obtained, the patient was brought to the operating room and placed in the left lateral decubitus position on the procedure Room table. The surgical site was then prepped and draped in the typical sterile fashion.      Local anesthetic was delivered, and an incision was carried out over the mass. The surrounding tissue was carefully dissected to free the mass, to a width of 3.0 cm, length of 3.0 cm, and subcutaneous depth of 2.0cm. Once excised this was passed off the field and sent routine to pathology. Hemostasis was assured. Sterile saline irrigant was used.  The mass appeared to be a lipoma vs a lymph node.        The subcutaneous tissue was closed in layers using  interrupted 3-0 Vicryl suture, and skin was closed using a subcuticular suture of 4-0 Monocryl. The skin was cleansed and dried, before administration of Dermabond glue, set to function as a sterile bandage.      The patient tolerated the procedure well.    Howard Roche DO on 8/12/2021 at 10:21 AM        Again, thank you for allowing me to participate in the care of your patient.        Sincerely,        Howard Roche DO

## 2021-08-12 NOTE — PROGRESS NOTES
Procedure Date: 08/12/21     PREOPERATIVE DIAGNOSIS: 1. Left posterior auricular mass  POSTOPERATIVE DIAGNOSIS: Same     PROCEDURE: Excision of right posterior auricular mass     ATTENDING SURGEON: Howard Roche DO    ESTIMATED BLOOD LOSS: Minimal    ANESTHESIA: Local Anesthesia     INDICATIONS FOR PROCEDURE: : Arsenio Stockton presents with a history of an enlarging, tender right posterior auricular mass which has been slowly growing and causes discomfort at times. After discussion was held with the patient regarding indications, risks, benefits and alternatives, benefits, and risks, his questions were addressed and he understood and wished to proceed. Specific risks discussed included bleeding, infection, seroma, need for additional treatment, nontherapeutic intervention, wound complication (such as dehiscence), recurrence, and rare complications related to surgery and/or anesthesia such as venous thromboembolism and cardiorespiratory complications.     PROCEDURE: After informed consent was obtained, the patient was brought to the operating room and placed in the left lateral decubitus position on the procedure Room table. The surgical site was then prepped and draped in the typical sterile fashion.      Local anesthetic was delivered, and an incision was carried out over the mass. The surrounding tissue was carefully dissected to free the mass, to a width of 3.0 cm, length of 3.0 cm, and subcutaneous depth of 2.0cm. Once excised this was passed off the field and sent routine to pathology. Hemostasis was assured. Sterile saline irrigant was used.  The mass appeared to be a lipoma vs a lymph node.        The subcutaneous tissue was closed in layers using interrupted 3-0 Vicryl suture, and skin was closed using a subcuticular suture of 4-0 Monocryl. The skin was cleansed and dried, before administration of Dermabond glue, set to function as a sterile bandage.      The patient tolerated the procedure  monty.    Howard Roche,  on 8/12/2021 at 10:21 AM

## 2021-08-12 NOTE — PATIENT INSTRUCTIONS
Procedural Follow-up care   Most wounds heal within 10 days. But an infection may sometimes occur despite proper treatment. Be sure to check the wound daily for the signs of infection listed below. Stitches should be taken out within 7 to14 days (please schedule at the  before you leave.)  You may have surgical tape closures. If these have not fallen off after 7 days, you can remove them yourself unless you were told otherwise.  When to seek medical advice  Call your healthcare provider right away if any of these occur:    Pain in the wound gets worse    Redness, swelling, or pus coming from the wound    Fever of 100.4 F (38 C) or higher, or as directed by your provider    Stitches come apart or fall out, or surgical tape falls off before 5 days    The wound edges reopen    Numb feeling that doesn t go away by the time stitches are removed      Please call our office at [phone] with any questions or concerns (listen to the options for Specialty Clinic).

## 2021-08-13 LAB
PATH REPORT.COMMENTS IMP SPEC: NORMAL
PATH REPORT.COMMENTS IMP SPEC: NORMAL
PATH REPORT.FINAL DX SPEC: NORMAL
PATH REPORT.GROSS SPEC: NORMAL
PATH REPORT.MICROSCOPIC SPEC OTHER STN: NORMAL
PATH REPORT.RELEVANT HX SPEC: NORMAL
PHOTO IMAGE: NORMAL

## 2021-09-25 ENCOUNTER — HEALTH MAINTENANCE LETTER (OUTPATIENT)
Age: 51
End: 2021-09-25

## 2021-10-23 ENCOUNTER — HOSPITAL ENCOUNTER (EMERGENCY)
Facility: CLINIC | Age: 51
Discharge: HOME | End: 2021-10-23
Attending: EMERGENCY MEDICINE | Admitting: EMERGENCY MEDICINE
Payer: COMMERCIAL

## 2021-10-23 VITALS
OXYGEN SATURATION: 97 % | RESPIRATION RATE: 20 BRPM | HEART RATE: 96 BPM | SYSTOLIC BLOOD PRESSURE: 158 MMHG | DIASTOLIC BLOOD PRESSURE: 83 MMHG | TEMPERATURE: 98.8 F

## 2021-10-23 DIAGNOSIS — Z20.822 SUSPECTED 2019 NOVEL CORONAVIRUS INFECTION: ICD-10-CM

## 2021-10-23 DIAGNOSIS — Z20.822 SUSPECTED COVID-19 VIRUS INFECTION: ICD-10-CM

## 2021-10-23 LAB
DEPRECATED S PYO AG THROAT QL EIA: NEGATIVE
FLUAV RNA SPEC QL NAA+PROBE: NEGATIVE
FLUBV RNA RESP QL NAA+PROBE: NEGATIVE
GROUP A STREP BY PCR: NOT DETECTED
RSV RNA SPEC NAA+PROBE: NEGATIVE
SARS-COV-2 RNA RESP QL NAA+PROBE: POSITIVE

## 2021-10-23 PROCEDURE — 87637 SARSCOV2&INF A&B&RSV AMP PRB: CPT | Performed by: EMERGENCY MEDICINE

## 2021-10-23 PROCEDURE — 99282 EMERGENCY DEPT VISIT SF MDM: CPT | Performed by: EMERGENCY MEDICINE

## 2021-10-23 PROCEDURE — 99283 EMERGENCY DEPT VISIT LOW MDM: CPT | Performed by: EMERGENCY MEDICINE

## 2021-10-23 PROCEDURE — C9803 HOPD COVID-19 SPEC COLLECT: HCPCS | Performed by: EMERGENCY MEDICINE

## 2021-10-23 PROCEDURE — 87651 STREP A DNA AMP PROBE: CPT | Performed by: EMERGENCY MEDICINE

## 2021-10-23 ASSESSMENT — ENCOUNTER SYMPTOMS
COUGH: 1
MYALGIAS: 1
FEVER: 1
EYES NEGATIVE: 1
NEUROLOGICAL NEGATIVE: 1
ALLERGIC/IMMUNOLOGIC NEGATIVE: 1
CHILLS: 1
HEMATOLOGIC/LYMPHATIC NEGATIVE: 1
GASTROINTESTINAL NEGATIVE: 1
SORE THROAT: 1
PSYCHIATRIC NEGATIVE: 1
CARDIOVASCULAR NEGATIVE: 1

## 2021-10-23 NOTE — DISCHARGE INSTRUCTIONS
1) You will be notified if your COVID-19 test is positive in the next 3 to 5 days. Your rapid strep test was negative    2) Be sure to continue to quarantine at home and notify close contacts if positive test results.

## 2021-10-23 NOTE — ED PROVIDER NOTES
History     Chief Complaint   Patient presents with     Fever     Cough     Generalized Body Aches     Pharyngitis     HPI  Arsenio Stockton is a 50 year old male who presents with report of fever cough generalized body ache and pharyngitis.  Patient has a history of GERD, obesity, sleep apnea, hypertension.  He is currently prescribed chlorthalidone, Pepcid sildenafil, and Zyrtec.  On examination patient 5 by car reporting he developed symptoms 3 days earlier.  He reports sore throat myalgias, fever chills, cough.  Patient reports been taking [place] and over-the-counter flu medication.  He does not smoke cigarettes.  He works as a  at [employer].  Patient reports has been on quarantine at home and was told to have a Covid test and that he could not return to work for at least 10 days.  He reports he got tested for COVID-19 in September 2020 and tested negative.  Patient reports no diarrhea.  He reports he is currently unvaccinated.  He reports his spouse and daughter have vaccinated.  He is not certain if he has had recent contact with COVID-19 but suspect he may been exposed at his job.  With feeling poorly he presents for further care.    Allergies:  No Known Allergies    Problem List:    Patient Active Problem List    Diagnosis Date Noted     Morbid obesity (H) 10/01/2018     Priority: Medium     Sleep apnea      Priority: Medium     GERD (gastroesophageal reflux disease)      Priority: Medium     Benign essential hypertension 03/27/2017     Priority: Medium     Elevated blood pressure reading without diagnosis of hypertension 03/06/2015     Priority: Medium     CARDIOVASCULAR SCREENING; LDL GOAL LESS THAN 160 02/06/2014     Priority: Medium        Past Medical History:    Past Medical History:   Diagnosis Date     GERD (gastroesophageal reflux disease)      Hypertension      Sleep apnea        Past Surgical History:    No past surgical history on file.    Family History:    Family  History   Problem Relation Age of Onset     Unknown/Adopted Maternal Grandmother      Unknown/Adopted Maternal Grandfather        Social History:  Marital Status:   [2]  Social History     Tobacco Use     Smoking status: Former Smoker     Years: 3.00     Types: Dip, chew, snus or snuff     Smokeless tobacco: Current User     Types: Chew     Last attempt to quit: 1/8/2014   Substance Use Topics     Alcohol use: Not Currently     Drug use: Not Currently     Types: Marijuana        Medications:    cetirizine (ZYRTEC) 10 MG tablet  chlorthalidone (HYGROTON) 25 MG tablet  famotidine (PEPCID) 20 MG tablet  POTASSIUM CITRATE PO  sildenafil (VIAGRA) 100 MG tablet  triamcinolone (KENALOG) 0.1 % ointment          Review of Systems   Constitutional: Positive for chills and fever.   HENT: Positive for sore throat.    Eyes: Negative.    Respiratory: Positive for cough.    Cardiovascular: Negative.    Gastrointestinal: Negative.    Genitourinary: Negative.    Musculoskeletal: Positive for myalgias.   Skin: Negative.    Allergic/Immunologic: Negative.    Neurological: Negative.    Hematological: Negative.    Psychiatric/Behavioral: Negative.    All other systems reviewed and are negative.      Physical Exam   BP: (!) 158/83  Pulse: 94  Temp: 98.8  F (37.1  C)  Resp: 18  SpO2: 95 %      Physical Exam  Vitals reviewed.   Constitutional:       Appearance: He is well-developed. He is diaphoretic.   HENT:      Head: Normocephalic and atraumatic.      Right Ear: Tympanic membrane normal.      Left Ear: Tympanic membrane normal.      Mouth/Throat:      Mouth: Mucous membranes are moist. No oral lesions.      Pharynx: No pharyngeal swelling, oropharyngeal exudate, posterior oropharyngeal erythema or uvula swelling.   Eyes:      Conjunctiva/sclera: Conjunctivae normal.      Pupils: Pupils are equal, round, and reactive to light.   Cardiovascular:      Rate and Rhythm: Normal rate and regular rhythm.      Heart sounds: Normal heart  sounds.   Pulmonary:      Effort: Pulmonary effort is normal. No respiratory distress.      Breath sounds: Normal breath sounds. No stridor. No wheezing, rhonchi or rales.   Chest:      Chest wall: No tenderness.   Abdominal:      General: There is no distension.      Palpations: There is no mass.      Tenderness: There is no abdominal tenderness. There is no guarding or rebound.      Hernia: No hernia is present.   Musculoskeletal:      Cervical back: Normal range of motion and neck supple.   Skin:     Capillary Refill: Capillary refill takes less than 2 seconds.   Neurological:      General: No focal deficit present.      Mental Status: He is alert and oriented to person, place, and time.   Psychiatric:         Mood and Affect: Mood normal.         Behavior: Behavior normal.         ED Course        Procedures              Critical Care time:  none               ED medications: none      ED Vitals:  Vitals:    10/23/21 1029 10/23/21 1140   BP: (!) 158/83    Pulse: 94 96   Resp: 18 20   Temp: 98.8  F (37.1  C)    TempSrc: Oral    SpO2: 95% 97%     ED labs and imaging:  Results for orders placed or performed during the hospital encounter of 10/23/21   Streptococcus A Rapid Scr w Reflx to PCR     Status: Normal    Specimen: Throat; Swab   Result Value Ref Range    Group A Strep antigen Negative Negative       Assessments & Plan (with Medical Decision Making)   Assessment Summary and Clinical Impression: 50-year-old who presented with cough, generalized body aches, sore throat chills and sweats. Symptoms suspicious for COVID-19 illness. Patient is unvaccinated.  He reported symptoms over the last 3 days.  He has been on quarantine and cannot return to work for 10 days by report.  He presented for COVID-19 testing and further evaluation.  He appeared in no acute distress he was 96% on room air.  he was diaphoretic at rest. He reported no chest pain or shortness of breath. His lungs are clear to auscultation.  He agreed  to COVID-19 testing and expressed understanding that his test results may take 3 to 5 days.  We discussed quarantine measures.  Rapid strep test was negative. Supportive care measures were reviewed with low threshold to return for reevaluation.    ED course and Plan:  Reviewed the medical record.  We discussed his unvaccinated status.  He was encouraged to consider vaccination after his illness resolves. COVID-19 testing was completed.  Results pending he will be notified by the ED follow-up nurses if test result is positive.  Rapid strep test was negative. We discussed supportive care measures at home including scheduled Tylenol and ibuprofen.  We reviewed worrisome symptoms and reasons to return for reevaluation he expressed understanding comfort with plan of care.      Disclaimer: This note consists of symbols derived from keyboarding, dictation and/or voice recognition software. As a result, there may be errors in the script that have gone undetected. Please consider this when interpreting information found in this chart.  I have reviewed the nursing notes.    I have reviewed the findings, diagnosis, plan and need for follow up with the patient.       Discharge Medication List as of 10/23/2021 11:32 AM          Final diagnoses:   Suspected 2019 novel coronavirus infection   Suspected COVID-19 virus infection       10/23/2021   [facility] EMERGENCY DEPT     Artis Carty MD  10/23/21 [id number]

## 2021-10-24 ENCOUNTER — PATIENT OUTREACH (OUTPATIENT)
Dept: CARE COORDINATION | Facility: CLINIC | Age: 51
End: 2021-10-24

## 2021-10-24 NOTE — PROGRESS NOTES
Care Coordination ED Discharge Follow up Note  Pt on home virtual monitoring program for COVID-19, call made to do assessment, verify follow-up appt and offer care coordination, no answer. Left message asking pt to make appt for virtual visit in the next one to two days and to accept the invitation to participate in [facility]. Left phone number for triage, [phone]. RN will call back tomorrow.

## 2021-10-26 NOTE — PROGRESS NOTES
Care Coordination Hospital/ED Discharge Follow up Note    Patient referred for Home Virtual COVID-19 Monitoring Program.  Second call made to complete assessment, verify or assist with scheduling follow up appt, and ensure patient is engaged with [facility], no answer.  Left message reminding pt to schedule a virtual visit with their PCP in the next one to two days, provided pt with [facility]'s 24/7 nurse triage/central scheduling number ([phone]).  Pt actively participating in [facility].  RN will make no further outreach attempts at this time.       Cecilia Leblanc RN  [facility] Care Coordination

## 2021-10-30 ENCOUNTER — NURSE TRIAGE (OUTPATIENT)
Dept: NURSING | Facility: CLINIC | Age: 51
End: 2021-10-30

## 2021-10-30 NOTE — TELEPHONE ENCOUNTER
Patient was diagnosed with covid on Sunday October 24th.  Symptoms are sweating.  Denies fever.  Cough is present and sore chest.  Patient is tired most of the time.  Patient is staying hydrated.  Patient is requesting antibody infusion Monoclonal.  FNA referred patient to [facility].  Patient will continue to monitor symptoms.    COVID 19 Nurse Triage Plan/Patient Instructions    Please be aware that novel coronavirus (COVID-19) may be circulating in the community. If you develop symptoms such as fever, cough, or SOB or if you have concerns about the presence of another infection including coronavirus (COVID-19), please contact your health care provider or visit [email or web address].     Disposition/Instructions    Home care recommended. Follow home care protocol based instructions.    Thank you for taking steps to prevent the spread of this virus.  o Limit your contact with others.  o Wear a simple mask to cover your cough.  o Wash your hands well and often.    Resources    [facility]: About COVID-19: [email or web address]    CDC: What to Do If You're Sick: www.cdc.gov/coronavirus/2019-ncov/about/steps-when-sick.html    CDC: Ending Home Isolation: www.cdc.gov/coronavirus/2019-ncov/hcp/disposition-in-home-patients.html     CDC: Caring for Someone: www.cdc.gov/coronavirus/2019-ncov/if-you-are-sick/care-for-someone.html     [facility]: Interim Guidance for Hospital Discharge to Home: www.health.[facility].mn.us/diseases/coronavirus/hcp/hospdischarge.pdf    [facility] clinical trials (COVID-19 research studies): clinicalaffairs.[facility]/umn-clinical-trials     Below are the COVID-19 hotlines at the [facility] of [facility] ([facility]). Interpreters are available.   o For health questions: Call 889-074-7719 or [phone] (7 a.m. to 7 p.m.)  o For questions about schools and childcare: Call 149-875-8633 or 1-236.516.1834 (7 a.m. to 7 p.m.)                       Additional Information    Negative:  "Severe difficulty breathing (e.g., struggling for each breath, speaks in single words)    Negative: Difficult to awaken or acting confused (e.g., disoriented, slurred speech)    Negative: Shock suspected (e.g., cold/pale/clammy skin, too weak to stand, low BP, rapid pulse)    Negative: [1] Chest pain lasts > 5 minutes AND [2] history of heart disease  (i.e., heart attack, bypass surgery, angina, angioplasty, CHF; not just a heart murmur)    Negative: [1] Chest pain lasts > 5 minutes AND [2] described as crushing, pressure-like, or heavy    Negative: [1] Chest pain lasts > 5 minutes AND [2] age > 50    Negative: [1] Chest pain lasts > 5 minutes AND [2] age > 30 AND [3] at least one cardiac risk factor (i.e., hypertension, diabetes, obesity, smoker or strong family history of heart disease)    Negative: [1] Chest pain lasts > 5 minutes AND [2] not relieved with nitroglycerin    Negative: Passed out (i.e., lost consciousness, collapsed and was not responding)    Negative: Heart beating < 50 beats per minute OR > 140 beats per minute    Negative: Visible sweat on face or sweat dripping down face    Negative: Sounds like a life-threatening emergency to the triager    Negative: SEVERE chest pain    Negative: [1] Intermittent  chest pain or \"angina\" AND [2] increasing in severity or frequency  (Exception: pains lasting a few seconds)    Negative: Pain also present in shoulder(s) or arm(s) or jaw  (Exception: pain is clearly made worse by movement)    Negative: Difficulty breathing    Negative: Dizziness or lightheadedness    Negative: Coughing up blood    Negative: Cocaine use within last 3 days    Negative: History of prior \"blood clot\" in leg or lungs (i.e., deep vein thrombosis, pulmonary embolism)    Negative: Recent illness requiring prolonged bedrest (i.e., immobilization)    Negative: Hip or leg fracture in past 2 months (e.g., had cast on leg or ankle)    Negative: Major surgery in the past month    Negative: " "Recent long-distance travel with prolonged time in car, bus, plane, or train (i.e., within past 2 weeks; 6 or  more hours duration)    Negative: Chest pain lasts > 5 minutes (Exceptions: chest pain occurring > 3 days ago and now asymptomatic; same as previously diagnosed heartburn and has accompanying sour taste in mouth)    Negative: Taking a deep breath makes pain worse    Negative: Patient sounds very sick or weak to the triager    Negative: [1] Chest pain lasts > 5 minutes AND [2] occurred > 3 days ago (72 hours) AND [3] NO chest pain or cardiac symptoms now    Negative: [1] Chest pain lasting <= 5 minutes AND [2] NO chest pain or cardiac symptoms now(Exceptions: pains lasting a few seconds)    Negative: Fever > 100.5 F (38.1 C)    Negative: Rash in same area as pain (may be described as \"small blisters\")    Negative: [1] Patient claims chest pain is same as previously diagnosed \"heartburn\" AND [2] describes burning in chest AND [3] accompanying sour taste in mouth    Negative: [1] Chest pain lasting <= 5 minutes AND [2] has not taken prescribed nitroglycerin    Negative: [1] Chest pain lasting <= 5 minutes AND [2] completely relieved by nitroglycerin    Negative: [1] Intermittent chest pain from \"angina\" AND [2] NO increase in severity or frequency    Negative: Chest pain(s) lasting a few seconds from coughing AND [2] persists > 3 days    Negative: [1] Chest pain(s) lasting a few seconds AND [2] persists > 3 days    Chest pain(s) lasting a few seconds from coughing    Protocols used: CHEST PAIN-A-AH      "

## 2021-11-01 ENCOUNTER — VIRTUAL VISIT (OUTPATIENT)
Dept: FAMILY MEDICINE | Facility: CLINIC | Age: 51
End: 2021-11-01
Payer: COMMERCIAL

## 2021-11-01 DIAGNOSIS — U07.1 INFECTION DUE TO 2019 NOVEL CORONAVIRUS: Primary | ICD-10-CM

## 2021-11-01 PROCEDURE — 99213 OFFICE O/P EST LOW 20 MIN: CPT | Mod: TEL | Performed by: NURSE PRACTITIONER

## 2021-11-01 NOTE — PATIENT INSTRUCTIONS
Continue to rest, fluids, monitoring oxygen level.          Our Clinic hours are:  Mondays    7:20 am - 7 pm  Tues -  Fri  7:20 am - 5 pm    Clinic Phone: [phone]    The clinic lab opens at 7:30 am Mon - Fri and appointments are required.    [facility]. [phone]  Monday  8 am - 7pm  Tues - Fri 8 am - 5:30 pm

## 2021-11-01 NOTE — PROGRESS NOTES
Arsenio is a 50 year old who is being evaluated via a billable telephone visit.      What phone number would you like to be contacted at? [phone]  How would you like to obtain your AVS? MyChart    Assessment & Plan     Infection due to 2019 novel coronavirus      Off work 10/21/21 and will plan to continue off work 11/15/21.             See Patient Instructions  Patient Instructions   Continue to rest, fluids, monitoring oxygen level.          Our Clinic hours are:  Mondays    7:20 am - 7 pm  Tues -  Fri  7:20 am - 5 pm    Clinic Phone: [phone]    The clinic lab opens at 7:30 am Mon - Fri and appointments are required.    [facility]. [phone]  Monday  8 am - 7pm  Tues - Fri 8 am - 5:30 pm             No follow-ups on file.    RODRICK Bustillos CNP  [facility]    Judy Cesar is a 50 year old who presents for the following health issues  accompanied by his self .    HPI       Concern for COVID-19  About how many days ago did these symptoms start? 10/24/21  Is this your first visit for this illness? Yes  In the 14 days before your symptoms started, have you had close contact with someone with COVID-19 (Coronavirus)? Yes, I have been in contact with someone who has COVID-19/Coronavirus (confirmed by lab test).  Do you have a fever or chills? Yes, I felt feverish or had chills  Are you having new or worsening difficulty breathing? Yes   Please describe what kind of difficulty you are having breathing:Mild dyspnea (able to do ADLs without difficulty, mild shortness of breath with activities such as climbing one or two flights of stairs or walking briskly)  Do you have new or worsening cough? Yes, it's a dry cough.   Have you had any new or unexplained body aches? YES    Have you experienced any of the following NEW symptoms?    Headache: No    Sore throat: No    Loss of taste or smell: No    Chest pain: YES    Diarrhea: YES    Rash: No  What  treatments have you tried? Nyquil and cough GTTS   Who do you live with? Wife   Are you, or a household member, a healthcare worker or a ? No  Do you live in a nursing home, group home, or shelter? No  Do you have a way to get food/medications if quarantined? Yes, I have a friend or family member who can help me.        Review of Systems   See above      Objective           Vitals:  No vitals were obtained today due to virtual visit.    Physical Exam   healthy, alert and no distress  PSYCH: Alert and oriented times 3; coherent speech, normal   rate and volume, able to articulate logical thoughts, able   to abstract reason, no tangential thoughts, no hallucinations   or delusions  His affect is normal  RESP: No cough, no audible wheezing, able to talk in full sentences  Remainder of exam unable to be completed due to telephone visits                Phone call duration: 14 minutes

## 2021-11-02 ENCOUNTER — TELEPHONE (OUTPATIENT)
Dept: FAMILY MEDICINE | Facility: CLINIC | Age: 51
End: 2021-11-02

## 2021-11-02 NOTE — TELEPHONE ENCOUNTER
Reason for Call:  Form, our goal is to have forms completed with 72 hours, however, some forms may require a visit or additional information.    Type of letter, form or note:  medical - Unemployement form     Who is the form from?: Patient    Where did the form come from: Patient or family brought in       What clinic location was the form placed at?: [facility]    Where the form was placed: Given to physician    What number is listed as a contact on the form?: [phone]       Additional comments:     Call taken on 11/2/2021 at 1:43 PM by Sarah Conway

## 2021-11-02 NOTE — TELEPHONE ENCOUNTER
Completed form to  - We cannot fax form because pt needs to sign form.  Copy to scanning and pt notified.

## 2021-12-06 DIAGNOSIS — N52.9 ERECTILE DYSFUNCTION, UNSPECIFIED ERECTILE DYSFUNCTION TYPE: ICD-10-CM

## 2021-12-06 DIAGNOSIS — J31.0 CHRONIC RHINITIS: ICD-10-CM

## 2021-12-08 RX ORDER — SILDENAFIL 100 MG/1
100 TABLET, FILM COATED ORAL DAILY PRN
Qty: 6 TABLET | Refills: 0 | Status: SHIPPED | OUTPATIENT
Start: 2021-12-08 | End: 2022-03-14

## 2021-12-08 RX ORDER — CETIRIZINE HYDROCHLORIDE 10 MG/1
TABLET ORAL
Qty: 90 TABLET | Refills: 0 | Status: SHIPPED | OUTPATIENT
Start: 2021-12-08 | End: 2022-04-20

## 2022-03-14 ENCOUNTER — MYC REFILL (OUTPATIENT)
Dept: FAMILY MEDICINE | Facility: CLINIC | Age: 52
End: 2022-03-14
Payer: COMMERCIAL

## 2022-03-14 DIAGNOSIS — N52.9 ERECTILE DYSFUNCTION, UNSPECIFIED ERECTILE DYSFUNCTION TYPE: ICD-10-CM

## 2022-03-16 RX ORDER — SILDENAFIL 100 MG/1
100 TABLET, FILM COATED ORAL DAILY PRN
Qty: 6 TABLET | Refills: 0 | Status: SHIPPED | OUTPATIENT
Start: 2022-03-16 | End: 2022-04-20

## 2022-08-25 ENCOUNTER — TELEPHONE (OUTPATIENT)
Dept: FAMILY MEDICINE | Facility: CLINIC | Age: 52
End: 2022-08-25

## 2022-08-25 DIAGNOSIS — I10 BENIGN ESSENTIAL HYPERTENSION: ICD-10-CM

## 2022-08-25 DIAGNOSIS — J31.0 CHRONIC RHINITIS: ICD-10-CM

## 2022-08-25 DIAGNOSIS — N52.9 ERECTILE DYSFUNCTION, UNSPECIFIED ERECTILE DYSFUNCTION TYPE: ICD-10-CM

## 2022-08-26 NOTE — TELEPHONE ENCOUNTER
"[facility] message sent to pt asking him to schedule yearly exam .  Yenny Kwon RN     Requested Prescriptions   Pending Prescriptions Disp Refills    ALLERGY RELIEF CETIRIZINE 10 MG tablet [Pharmacy Med Name: Allergy Relief (cetirizine) 10 mg tablet] 90 tablet 0     Sig: TAKE 1 TABLET BY MOUTH EVERY EVENING        Antihistamines Protocol Passed - 8/25/2022  9:48 AM        Passed - Patient is 3-64 years of age     Apply weight-based dosing for peds patients age 3 - 12 years of age.    Forward request to provider for patients under the age of 3 or over the age of 64.            Passed - Recent (12 mo) or future (30 days) visit within the authorizing provider's specialty     Patient has had an office visit with the authorizing provider or a provider within the authorizing providers department within the previous 12 mos or has a future within next 30 days. See \"Patient Info\" tab in inbasket, or \"Choose Columns\" in Meds & Orders section of the refill encounter.              Passed - Medication is active on med list           chlorthalidone (HYGROTON) 25 MG tablet [Pharmacy Med Name: chlorthalidone 25 mg tablet] 90 tablet 0     Sig: Take 1 tablet (25 mg) by mouth daily        Diuretics (Including Combos) Protocol Failed - 8/25/2022  9:48 AM        Failed - Blood pressure under 140/90 in past 12 months       BP Readings from Last 3 Encounters:   10/23/21 (!) 158/83   08/12/21 128/84   07/29/21 (!) 153/92                 Failed - Normal serum creatinine on file in past 12 months       Recent Labs   Lab Test 07/27/21  1621   CR 0.91              Failed - Normal serum potassium on file in past 12 months       Recent Labs   Lab Test 07/27/21  1621   POTASSIUM 3.7                    Failed - Normal serum sodium on file in past 12 months       Recent Labs   Lab Test 07/27/21  1621                 Passed - Recent (12 mo) or future (30 days) visit within the authorizing provider's specialty     Patient has had an office " "visit with the authorizing provider or a provider within the authorizing providers department within the previous 12 mos or has a future within next 30 days. See \"Patient Info\" tab in inbasket, or \"Choose Columns\" in Meds & Orders section of the refill encounter.              Passed - Medication is active on med list        Passed - Patient is age 18 or older           sildenafil (VIAGRA) 100 MG tablet [Pharmacy Med Name: sildenafil 100 mg tablet] 6 tablet 2     Sig: Take 1 tablet (100 mg) by mouth daily as needed (ED) 30 min to 4 hrs before sex. Do not use with nitroglycerin, terazosin or doxazosin.        Erectile Dysfuction Protocol Passed - 8/25/2022  9:48 AM        Passed - Absence of nitrates on medication list        Passed - Absence of Alpha Blockers on Med list        Passed - Recent (12 mo) or future (30 days) visit within the authorizing provider's specialty     Patient has had an office visit with the authorizing provider or a provider within the authorizing providers department within the previous 12 mos or has a future within next 30 days. See \"Patient Info\" tab in inbasket, or \"Choose Columns\" in Meds & Orders section of the refill encounter.              Passed - Medication is active on med list        Passed - Patient is age 18 or older              "

## 2022-08-27 ENCOUNTER — HEALTH MAINTENANCE LETTER (OUTPATIENT)
Age: 52
End: 2022-08-27

## 2022-08-27 RX ORDER — CHLORTHALIDONE 25 MG/1
25 TABLET ORAL DAILY
Qty: 30 TABLET | Refills: 0 | Status: SHIPPED | OUTPATIENT
Start: 2022-08-27 | End: 2022-09-20

## 2022-08-27 RX ORDER — CETIRIZINE HYDROCHLORIDE 10 MG/1
TABLET ORAL
Qty: 30 TABLET | Refills: 0 | Status: SHIPPED | OUTPATIENT
Start: 2022-08-27 | End: 2022-09-22

## 2022-08-27 RX ORDER — SILDENAFIL 100 MG/1
100 TABLET, FILM COATED ORAL DAILY PRN
Qty: 6 TABLET | Refills: 0 | Status: SHIPPED | OUTPATIENT
Start: 2022-08-27 | End: 2022-09-20

## 2022-09-01 ENCOUNTER — OFFICE VISIT (OUTPATIENT)
Dept: URGENT CARE | Facility: URGENT CARE | Age: 52
End: 2022-09-01

## 2022-09-01 ENCOUNTER — ANCILLARY PROCEDURE (OUTPATIENT)
Dept: GENERAL RADIOLOGY | Facility: CLINIC | Age: 52
End: 2022-09-01
Attending: FAMILY MEDICINE
Payer: COMMERCIAL

## 2022-09-01 VITALS
WEIGHT: 290 LBS | OXYGEN SATURATION: 95 % | SYSTOLIC BLOOD PRESSURE: 130 MMHG | TEMPERATURE: 96.8 F | DIASTOLIC BLOOD PRESSURE: 86 MMHG | HEART RATE: 65 BPM | BODY MASS INDEX: 44.09 KG/M2

## 2022-09-01 DIAGNOSIS — M79.671 RIGHT FOOT PAIN: ICD-10-CM

## 2022-09-01 DIAGNOSIS — M10.9 ACUTE GOUT OF RIGHT FOOT, UNSPECIFIED CAUSE: ICD-10-CM

## 2022-09-01 DIAGNOSIS — M79.671 RIGHT FOOT PAIN: Primary | ICD-10-CM

## 2022-09-01 PROCEDURE — 99213 OFFICE O/P EST LOW 20 MIN: CPT | Performed by: FAMILY MEDICINE

## 2022-09-01 PROCEDURE — 73630 X-RAY EXAM OF FOOT: CPT | Mod: TC | Performed by: RADIOLOGY

## 2022-09-01 RX ORDER — PREDNISONE 20 MG/1
TABLET ORAL
Qty: 12 TABLET | Refills: 0 | Status: SHIPPED | OUTPATIENT
Start: 2022-09-01 | End: 2023-10-31

## 2022-09-01 NOTE — PROGRESS NOTES
(M79.671) Right foot pain  (primary encounter diagnosis)  Comment:   Plan: XR Foot Right G/E 3 Views            (M10.9) Acute gout of right foot, unspecified cause  Comment:   Plan: predniSONE (DELTASONE) 20 MG tablet            Discussion:  Patient has inflammation in her right foot.  X-ray unremarkable.  Suspicious for gout.  Treated accordingly.  Advised for follow-up.      CHIEF COMPLAINT    Right foot pain and swelling.      HISTORY    He twisted the foot about a week and a half ago.  It quieted down.  He was able to play golf.  Then a couple of days ago he had more swelling and tenderness and some redness.    He says both his father and a brother have gout.  He has not had an outbreak.      REVIEW OF SYSTEMS    Unremarkable except as above.      EXAM  /86   Pulse 65   Temp 96.8  F (36  C) (Tympanic)   Wt 131.5 kg (290 lb)   SpO2 95%   BMI 44.09 kg/m      Patient has some mild to moderate swelling on the dorsum of right foot.  There is some warmth and mild redness.  He is slightly tender over the proximal fifth metatarsal.      Results for orders placed or performed in visit on 09/01/22   XR Foot Right G/E 3 Views     Status: None    Narrative    XR FOOT RIGHT G/E 3 VIEWS 9/1/2022 12:26 PM    HISTORY: Right foot pain    COMPARISON: None.      Impression    IMPRESSION: No fracture or dislocation. No degenerative or erosive  arthritic changes. Moderate-sized plantar calcaneal spur.    SAMARIA BREAUX MD         SYSTEM ID:  [id number]

## 2022-09-19 ASSESSMENT — ENCOUNTER SYMPTOMS
HEMATURIA: 0
NAUSEA: 0
HEMATOCHEZIA: 0
JOINT SWELLING: 1
ABDOMINAL PAIN: 0
FREQUENCY: 0
DYSURIA: 0
DIARRHEA: 0
EYE PAIN: 0
HEADACHES: 0
CONSTIPATION: 0
CHILLS: 0
HEARTBURN: 0
SHORTNESS OF BREATH: 0
FEVER: 0
PARESTHESIAS: 0
COUGH: 0
ARTHRALGIAS: 1
DIZZINESS: 0
MYALGIAS: 0
WEAKNESS: 0
NERVOUS/ANXIOUS: 0
PALPITATIONS: 0
SORE THROAT: 0

## 2022-09-20 ENCOUNTER — ANCILLARY PROCEDURE (OUTPATIENT)
Dept: GENERAL RADIOLOGY | Facility: CLINIC | Age: 52
End: 2022-09-20
Attending: NURSE PRACTITIONER
Payer: COMMERCIAL

## 2022-09-20 ENCOUNTER — OFFICE VISIT (OUTPATIENT)
Dept: FAMILY MEDICINE | Facility: CLINIC | Age: 52
End: 2022-09-20
Payer: COMMERCIAL

## 2022-09-20 VITALS
WEIGHT: 300 LBS | DIASTOLIC BLOOD PRESSURE: 80 MMHG | SYSTOLIC BLOOD PRESSURE: 138 MMHG | HEART RATE: 63 BPM | TEMPERATURE: 97.3 F | HEIGHT: 68 IN | BODY MASS INDEX: 45.47 KG/M2 | OXYGEN SATURATION: 97 % | RESPIRATION RATE: 16 BRPM

## 2022-09-20 DIAGNOSIS — M10.9 ACUTE GOUTY ARTHRITIS: ICD-10-CM

## 2022-09-20 DIAGNOSIS — M25.562 LEFT KNEE PAIN, UNSPECIFIED CHRONICITY: ICD-10-CM

## 2022-09-20 DIAGNOSIS — Z00.00 ROUTINE GENERAL MEDICAL EXAMINATION AT A HEALTH CARE FACILITY: Primary | ICD-10-CM

## 2022-09-20 DIAGNOSIS — I10 BENIGN ESSENTIAL HYPERTENSION: ICD-10-CM

## 2022-09-20 DIAGNOSIS — Z13.220 SCREENING FOR LIPOID DISORDERS: ICD-10-CM

## 2022-09-20 DIAGNOSIS — E66.01 MORBID OBESITY (H): ICD-10-CM

## 2022-09-20 DIAGNOSIS — N52.9 ERECTILE DYSFUNCTION, UNSPECIFIED ERECTILE DYSFUNCTION TYPE: ICD-10-CM

## 2022-09-20 DIAGNOSIS — Z12.11 SCREEN FOR COLON CANCER: ICD-10-CM

## 2022-09-20 DIAGNOSIS — M79.671 RIGHT FOOT PAIN: ICD-10-CM

## 2022-09-20 DIAGNOSIS — K21.9 GASTROESOPHAGEAL REFLUX DISEASE WITHOUT ESOPHAGITIS: ICD-10-CM

## 2022-09-20 LAB
ANION GAP SERPL CALCULATED.3IONS-SCNC: 9 MMOL/L (ref 7–15)
BUN SERPL-MCNC: 23.4 MG/DL (ref 6–20)
CALCIUM SERPL-MCNC: 9.4 MG/DL (ref 8.6–10)
CHLORIDE SERPL-SCNC: 104 MMOL/L (ref 98–107)
CHOLEST SERPL-MCNC: 172 MG/DL
CREAT SERPL-MCNC: 0.98 MG/DL (ref 0.67–1.17)
DEPRECATED HCO3 PLAS-SCNC: 26 MMOL/L (ref 22–29)
GFR SERPL CREATININE-BSD FRML MDRD: >90 ML/MIN/1.73M2
GLUCOSE SERPL-MCNC: 108 MG/DL (ref 70–99)
HDLC SERPL-MCNC: 47 MG/DL
LDLC SERPL CALC-MCNC: 103 MG/DL
NONHDLC SERPL-MCNC: 125 MG/DL
POTASSIUM SERPL-SCNC: 3.6 MMOL/L (ref 3.4–5.3)
SODIUM SERPL-SCNC: 139 MMOL/L (ref 136–145)
TRIGL SERPL-MCNC: 109 MG/DL
URATE SERPL-MCNC: 8.5 MG/DL (ref 3.4–7)

## 2022-09-20 PROCEDURE — 80061 LIPID PANEL: CPT | Performed by: NURSE PRACTITIONER

## 2022-09-20 PROCEDURE — 84550 ASSAY OF BLOOD/URIC ACID: CPT | Performed by: NURSE PRACTITIONER

## 2022-09-20 PROCEDURE — 90686 IIV4 VACC NO PRSV 0.5 ML IM: CPT | Performed by: NURSE PRACTITIONER

## 2022-09-20 PROCEDURE — 36415 COLL VENOUS BLD VENIPUNCTURE: CPT | Performed by: NURSE PRACTITIONER

## 2022-09-20 PROCEDURE — 73560 X-RAY EXAM OF KNEE 1 OR 2: CPT | Mod: TC | Performed by: RADIOLOGY

## 2022-09-20 PROCEDURE — 90471 IMMUNIZATION ADMIN: CPT | Performed by: NURSE PRACTITIONER

## 2022-09-20 PROCEDURE — 80048 BASIC METABOLIC PNL TOTAL CA: CPT | Performed by: NURSE PRACTITIONER

## 2022-09-20 PROCEDURE — 99396 PREV VISIT EST AGE 40-64: CPT | Mod: 25 | Performed by: NURSE PRACTITIONER

## 2022-09-20 PROCEDURE — 99214 OFFICE O/P EST MOD 30 MIN: CPT | Mod: 25 | Performed by: NURSE PRACTITIONER

## 2022-09-20 RX ORDER — ALLOPURINOL 100 MG/1
100 TABLET ORAL DAILY
Qty: 90 TABLET | Refills: 1 | Status: SHIPPED | OUTPATIENT
Start: 2022-09-20 | End: 2023-03-13

## 2022-09-20 RX ORDER — CHLORTHALIDONE 25 MG/1
25 TABLET ORAL DAILY
Qty: 30 TABLET | Refills: 0 | Status: SHIPPED | OUTPATIENT
Start: 2022-09-20 | End: 2022-11-03

## 2022-09-20 RX ORDER — FAMOTIDINE 20 MG/1
20 TABLET, FILM COATED ORAL 2 TIMES DAILY
Qty: 180 TABLET | Refills: 3 | Status: SHIPPED | OUTPATIENT
Start: 2022-09-20 | End: 2023-09-26

## 2022-09-20 RX ORDER — SILDENAFIL 100 MG/1
100 TABLET, FILM COATED ORAL DAILY PRN
Qty: 6 TABLET | Refills: 0 | Status: SHIPPED | OUTPATIENT
Start: 2022-09-20 | End: 2022-11-30

## 2022-09-20 ASSESSMENT — ENCOUNTER SYMPTOMS
FEVER: 0
NAUSEA: 0
CONSTIPATION: 0
ARTHRALGIAS: 1
DIZZINESS: 0
JOINT SWELLING: 1
NERVOUS/ANXIOUS: 0
SORE THROAT: 0
SHORTNESS OF BREATH: 0
DIARRHEA: 0
HEMATURIA: 0
ABDOMINAL PAIN: 0
FREQUENCY: 0
EYE PAIN: 0
HEADACHES: 0
DYSURIA: 0
MYALGIAS: 0
WEAKNESS: 0
HEMATOCHEZIA: 0
COUGH: 0
PARESTHESIAS: 0
PALPITATIONS: 0
HEARTBURN: 0
CHILLS: 0

## 2022-09-20 ASSESSMENT — PAIN SCALES - GENERAL: PAINLEVEL: MILD PAIN (2)

## 2022-09-20 NOTE — PROGRESS NOTES
SUBJECTIVE:   CC: Arsenio is an 51 year old who presents for preventative health visit.       Patient has been advised of split billing requirements and indicates understanding: Yes  Healthy Habits:     Getting at least 3 servings of Calcium per day:  NO    Bi-annual eye exam:  NO    Dental care twice a year:  NO    Sleep apnea or symptoms of sleep apnea:  None    Diet:  Carbohydrate counting    Frequency of exercise:  2-3 days/week    Duration of exercise:  15-30 minutes    Taking medications regularly:  Yes    Medication side effects:  None    PHQ-2 Total Score: 0    Additional concerns today:  No          Joint or Musculoskeletal Pain  Duration of complaint: since 6/22   Left knee and right foot   NKI of right foot or left knee, chronic and comes and goes    Hypertension Follow-up      Do you check your blood pressure regularly outside of the clinic? No     Are you following a low salt diet? Yes    Are your blood pressures ever more than 140 on the top number (systolic) OR more   than 90 on the bottom number (diastolic), for example 140/90? No      Today's PHQ-2 Score:   PHQ-2 ( 1999 [employer]) 9/19/2022   Q1: Little interest or pleasure in doing things 0   Q2: Feeling down, depressed or hopeless 0   PHQ-2 Score 0   PHQ-2 Total Score (12-17 Years)- Positive if 3 or more points; Administer PHQ-A if positive -   Q1: Little interest or pleasure in doing things Not at all   Q2: Feeling down, depressed or hopeless Not at all   PHQ-2 Score 0       Abuse: Current or Past(Physical, Sexual or Emotional)- No  Do you feel safe in your environment? Yes        Social History     Tobacco Use     Smoking status: Former Smoker     Years: 3.00     Types: Dip, chew, snus or snuff     Smokeless tobacco: Current User     Types: Chew   Substance Use Topics     Alcohol use: Not Currently     If you drink alcohol do you typically have >3 drinks per day or >7 drinks per week? No    Alcohol Use 9/19/2022   Prescreen: >3 drinks/day or >7  drinks/week? No       Last PSA: No results found for: PSA    Reviewed orders with patient. Reviewed health maintenance and updated orders accordingly - Yes  BP Readings from Last 3 Encounters:   09/20/22 138/80   09/01/22 130/86   10/23/21 (!) 158/83    Wt Readings from Last 3 Encounters:   09/20/22 136.1 kg (300 lb)   09/01/22 131.5 kg (290 lb)   07/27/21 128.8 kg (284 lb)                  Patient Active Problem List   Diagnosis     CARDIOVASCULAR SCREENING; LDL GOAL LESS THAN 160     Elevated blood pressure reading without diagnosis of hypertension     Benign essential hypertension     Sleep apnea     Morbid obesity (H)     GERD (gastroesophageal reflux disease)     History reviewed. No pertinent surgical history.    Social History     Tobacco Use     Smoking status: Former Smoker     Years: 3.00     Types: Dip, chew, snus or snuff     Smokeless tobacco: Current User     Types: Chew   Substance Use Topics     Alcohol use: Not Currently     Family History   Problem Relation Age of Onset     Unknown/Adopted Maternal Grandmother      Unknown/Adopted Maternal Grandfather          Current Outpatient Medications   Medication Sig Dispense Refill     ALLERGY RELIEF CETIRIZINE 10 MG tablet TAKE 1 TABLET BY MOUTH EVERY EVENING 30 tablet 0     chlorthalidone (HYGROTON) 25 MG tablet Take 1 tablet (25 mg) by mouth daily 30 tablet 0     famotidine (PEPCID) 20 MG tablet Take 1 tablet (20 mg) by mouth 2 times daily 180 tablet 3     sildenafil (VIAGRA) 100 MG tablet Take 1 tablet (100 mg) by mouth daily as needed (ED) 30 min to 4 hrs before sex. Do not use with nitroglycerin, terazosin or doxazosin. 6 tablet 0     POTASSIUM CITRATE PO Take 20 mEq by mouth daily Over the counter unsure of mg. (Patient not taking: No sig reported)       predniSONE (DELTASONE) 20 MG tablet Take 2 daily for 4 days, then 1 daily for 4 days. (Patient not taking: Reported on 9/20/2022) 12 tablet 0     triamcinolone (KENALOG) 0.1 % ointment Apply  sparingly to affected area three times daily for 14 days. (Patient not taking: No sig reported) 30 g 0     No Known Allergies  Recent Labs   Lab Test 07/27/21  1621 12/30/19  1000 10/01/18  1402 10/01/18  1401 04/10/17  1435 03/06/15  1440   A1C  --   --  5.5  --   --   --    LDL 97  --   --   --   --  90   HDL 54  --   --   --   --   --    TRIG 144  --   --   --   --   --    ALT  --   --  37  --  46  --    CR 0.91 1.01 0.91  --  0.95 0.91   GFRESTIMATED >90 88 90  --  86 >90  Non  GFR Calc     GFRESTBLACK  --  >90 >90  --  >90   GFR Calc   >90   GFR Calc     POTASSIUM 3.7 3.7 3.8  --  3.1* 3.8   TSH  --   --   --  0.68 0.39*  --         Reviewed and updated as needed this visit by clinical staff   Tobacco  Allergies  Meds  Problems  Med Hx  Surg Hx  Fam Hx  Soc   Hx          Reviewed and updated as needed this visit by Provider                   Past Medical History:   Diagnosis Date     GERD (gastroesophageal reflux disease)      Hypertension      Sleep apnea       History reviewed. No pertinent surgical history.    Review of Systems   Constitutional: Negative for chills and fever.   HENT: Negative for congestion, ear pain, hearing loss and sore throat.    Eyes: Negative for pain and visual disturbance.   Respiratory: Negative for cough and shortness of breath.    Cardiovascular: Negative for chest pain, palpitations and peripheral edema.   Gastrointestinal: Negative for abdominal pain, constipation, diarrhea, heartburn, hematochezia and nausea.   Genitourinary: Positive for impotence. Negative for dysuria, frequency, genital sores, hematuria, penile discharge and urgency.   Musculoskeletal: Positive for arthralgias and joint swelling. Negative for myalgias.   Skin: Negative for rash.   Neurological: Negative for dizziness, weakness, headaches and paresthesias.   Psychiatric/Behavioral: The patient is not nervous/anxious.          OBJECTIVE:   /80    "Pulse 63   Temp 97.3  F (36.3  C)   Resp 16   Ht 1.727 m (5' 8\")   Wt 136.1 kg (300 lb)   SpO2 97%   BMI 45.61 kg/m      Physical Exam  GENERAL: healthy, alert and no distress  EYES: Eyes grossly normal to inspection, and conjunctivae and sclerae normal  NECK: no adenopathy, no asymmetry, masses, or scars and thyroid normal to palpation  RESP: lungs clear to auscultation - no rales, rhonchi or wheezes  CV: regular rate and rhythm, normal S1 S2, no S3 or S4, no murmur, click or rub, no peripheral edema and peripheral pulses strong  ABDOMEN: soft, obese, nontender, no hepatosplenomegaly, no masses and bowel sounds normal  MS: no gross musculoskeletal defects noted, no edema, mild pain inner left knee joint line, pain and mild erythema/swelling outer right foot  SKIN: no suspicious lesions or rashes  NEURO: Normal strength and tone, mentation intact and speech normal  PSYCH: mentation appears normal, affect normal/bright    Diagnostic Test Results:  Labs reviewed in Epic  No results found for this or any previous visit (from the past 24 hour(s)).    ASSESSMENT/PLAN:       ICD-10-CM    1. Routine general medical examination at a health care facility  Z00.00    2. Screen for colon cancer  Z12.11 Colonoscopy Screening  Referral   3. Benign essential hypertension  I10 chlorthalidone (HYGROTON) 25 MG tablet     Basic metabolic panel  (Ca, Cl, CO2, Creat, Gluc, K, Na, BUN)   4. Gastroesophageal reflux disease without esophagitis  K21.9 famotidine (PEPCID) 20 MG tablet   5. Erectile dysfunction, unspecified erectile dysfunction type  N52.9 sildenafil (VIAGRA) 100 MG tablet   6. Morbid obesity (H)  E66.01    7. Right foot pain  M79.671 Uric acid   8. Left knee pain, unspecified chronicity  M25.562 Uric acid     XR Knee Left 1/2 Views   9. Screening for lipoid disorders  Z13.220 Lipid panel reflex to direct LDL Fasting     X ray of left knee pending.   Uric acid for right foot, it does sound suspicious for gouty " "arthritis. We reviewed avoiding purine diet.  If elevated, will start allopurinol and monitor.  Losing weight will definitely help and he will continue to watch his diet.  No concerns with GERD and his blood pressure is controlled, continue medications and monitoring; labs pending.  Flu shot given.  He will do colonoscopy.        COUNSELING:   Reviewed preventive health counseling, as reflected in patient instructions       Regular exercise       Healthy diet/nutrition       Colorectal cancer screening    Estimated body mass index is 45.61 kg/m  as calculated from the following:    Height as of this encounter: 1.727 m (5' 8\").    Weight as of this encounter: 136.1 kg (300 lb).     Weight management plan: Discussed healthy diet and exercise guidelines    He reports that he has quit smoking. His smoking use included dip, chew, snus or snuff. He quit after 3.00 years of use. His smokeless tobacco use includes chew.      Counseling Resources:  ATP IV Guidelines  Pooled Cohorts Equation Calculator  FRAX Risk Assessment  ICSI Preventive Guidelines  Dietary Guidelines for Americans, 2010  USDA's MyPlate  ASA Prophylaxis  Lung CA Screening    RODRICK Bustillos [facility]  "

## 2022-09-20 NOTE — PATIENT INSTRUCTIONS
Do your colonoscopy.  Will be notified of pending labs and x ray.  If uric acid elevated, will send prescription for daily medication to suppress that.    Preventive Health Recommendations  Male Ages 40 to 49    Yearly exam:             See your health care provider every year in order to  o   Review health changes.   o   Discuss preventive care.    o   Review your medicines if your doctor has prescribed any.  You should be tested each year for STDs (sexually transmitted diseases) if you re at risk.   Have a cholesterol test every 5 years.   Have a colonoscopy (test for colon cancer) if someone in your family has had colon cancer or polyps before age 50.   After age 45, have a diabetes test (fasting glucose). If you are at risk for diabetes, you should have this test every 3 years.    Talk with your health care provider about whether or not a prostate cancer screening test (PSA) is right for you.    Shots: Get a flu shot each year. Get a tetanus shot every 10 years.     Nutrition:  Eat at least 5 servings of fruits and vegetables daily.   Eat whole-grain bread, whole-wheat pasta and brown rice instead of white grains and rice.   Get adequate Calcium and Vitamin D.     Lifestyle  Exercise for at least 150 minutes a week (30 minutes a day, 5 days a week). This will help you control your weight and prevent disease.   Limit alcohol to one drink per day.   No smoking.   Wear sunscreen to prevent skin cancer.   See your dentist every six months for an exam and cleaning.

## 2022-10-26 ENCOUNTER — MYC REFILL (OUTPATIENT)
Dept: FAMILY MEDICINE | Facility: CLINIC | Age: 52
End: 2022-10-26

## 2022-10-26 DIAGNOSIS — J31.0 CHRONIC RHINITIS: ICD-10-CM

## 2022-10-28 RX ORDER — CETIRIZINE HYDROCHLORIDE 10 MG/1
10 TABLET ORAL EVERY EVENING
Qty: 30 TABLET | Refills: 0 | Status: SHIPPED | OUTPATIENT
Start: 2022-10-28 | End: 2022-11-30

## 2023-03-12 DIAGNOSIS — M10.9 ACUTE GOUTY ARTHRITIS: ICD-10-CM

## 2023-03-13 RX ORDER — ALLOPURINOL 100 MG/1
100 TABLET ORAL DAILY
Qty: 90 TABLET | Refills: 1 | Status: SHIPPED | OUTPATIENT
Start: 2023-03-13 | End: 2023-09-26

## 2023-03-13 NOTE — TELEPHONE ENCOUNTER
"Requested Prescriptions   Pending Prescriptions Disp Refills     allopurinol (ZYLOPRIM) 100 MG tablet [Pharmacy Med Name: allopurinol 100 mg tablet] 90 tablet 1     Sig: Take 1 tablet (100 mg) by mouth daily       Gout Agents Protocol Failed - 3/12/2023  8:02 AM        Failed - CBC on file in past 12 months     Recent Labs   Lab Test 04/10/17  1435   WBC 9.3   RBC 5.41   HGB 15.5   HCT 43.9                    Failed - ALT on file in past 12 months     Recent Labs   Lab Test 10/01/18  1402   ALT 37             Failed - Has Uric Acid on file in past 12 months and value is less than 6     Recent Labs   Lab Test 09/20/22  1101   URIC 8.5*     If level is 6mg/dL or greater, ok to refill one time and refer to provider.           Passed - Recent (12 mo) or future (30 days) visit within the authorizing provider's specialty     Patient has had an office visit with the authorizing provider or a provider within the authorizing providers department within the previous 12 mos or has a future within next 30 days. See \"Patient Info\" tab in inbasket, or \"Choose Columns\" in Meds & Orders section of the refill encounter.              Passed - Medication is active on med list        Passed - Patient is age 18 or older        Passed - Normal serum creatinine on file in the past 12 months     Recent Labs   Lab Test 09/20/22  1101   CR 0.98       Ok to refill medication if creatinine is low               "

## 2023-03-15 DIAGNOSIS — I10 BENIGN ESSENTIAL HYPERTENSION: ICD-10-CM

## 2023-03-15 RX ORDER — CHLORTHALIDONE 25 MG/1
25 TABLET ORAL DAILY
Qty: 90 TABLET | Refills: 2 | Status: SHIPPED | OUTPATIENT
Start: 2023-03-15 | End: 2023-11-12

## 2023-04-11 ENCOUNTER — MYC REFILL (OUTPATIENT)
Dept: FAMILY MEDICINE | Facility: CLINIC | Age: 53
End: 2023-04-11
Payer: COMMERCIAL

## 2023-04-11 DIAGNOSIS — N52.9 ERECTILE DYSFUNCTION, UNSPECIFIED ERECTILE DYSFUNCTION TYPE: ICD-10-CM

## 2023-04-11 RX ORDER — SILDENAFIL 100 MG/1
100 TABLET, FILM COATED ORAL DAILY PRN
Qty: 6 TABLET | Refills: 0 | Status: SHIPPED | OUTPATIENT
Start: 2023-04-11 | End: 2023-06-14

## 2023-06-14 ENCOUNTER — MYC REFILL (OUTPATIENT)
Dept: FAMILY MEDICINE | Facility: CLINIC | Age: 53
End: 2023-06-14
Payer: COMMERCIAL

## 2023-06-14 ENCOUNTER — MYC MEDICAL ADVICE (OUTPATIENT)
Dept: FAMILY MEDICINE | Facility: CLINIC | Age: 53
End: 2023-06-14
Payer: COMMERCIAL

## 2023-06-14 DIAGNOSIS — N52.9 ERECTILE DYSFUNCTION, UNSPECIFIED ERECTILE DYSFUNCTION TYPE: ICD-10-CM

## 2023-06-14 RX ORDER — SILDENAFIL 100 MG/1
100 TABLET, FILM COATED ORAL DAILY PRN
Qty: 6 TABLET | Refills: 0 | Status: SHIPPED | OUTPATIENT
Start: 2023-06-14 | End: 2023-07-24

## 2023-06-14 NOTE — TELEPHONE ENCOUNTER
Patient Quality Outreach    Patient is due for the following:   Colon Cancer Screening    Next Steps:   colonoscopy     Type of outreach:    Sent letter.      Questions for provider review:    None           Kiki Hurtado CMA

## 2023-06-14 NOTE — TELEPHONE ENCOUNTER
"Requested Prescriptions   Pending Prescriptions Disp Refills     sildenafil (VIAGRA) 100 MG tablet 6 tablet 0     Sig: Take 1 tablet (100 mg) by mouth daily as needed (ED) 30 min to 4 hrs before sex. Do not use with nitroglycerin, terazosin or doxazosin.       Erectile Dysfuction Protocol Passed - 6/14/2023  1:33 PM        Passed - Absence of nitrates on medication list        Passed - Absence of Alpha Blockers on Med list        Passed - Recent (12 mo) or future (30 days) visit within the authorizing provider's specialty     Patient has had an office visit with the authorizing provider or a provider within the authorizing providers department within the previous 12 mos or has a future within next 30 days. See \"Patient Info\" tab in inbasket, or \"Choose Columns\" in Meds & Orders section of the refill encounter.              Passed - Medication is active on med list        Passed - Patient is age 18 or older           Missy Park RN on 6/14/2023 at 5:40 PM    " Pt called to schedule her yrly appt with dr Ramy Hoskins can be rescheduled after 4/18/19

## 2023-06-28 DIAGNOSIS — J31.0 CHRONIC RHINITIS: ICD-10-CM

## 2023-06-28 RX ORDER — CETIRIZINE HYDROCHLORIDE 10 MG/1
10 TABLET ORAL EVERY EVENING
Qty: 90 TABLET | Refills: 0 | Status: SHIPPED | OUTPATIENT
Start: 2023-06-28 | End: 2023-09-26

## 2023-07-24 ENCOUNTER — MYC REFILL (OUTPATIENT)
Dept: FAMILY MEDICINE | Facility: CLINIC | Age: 53
End: 2023-07-24
Payer: COMMERCIAL

## 2023-07-24 DIAGNOSIS — N52.9 ERECTILE DYSFUNCTION, UNSPECIFIED ERECTILE DYSFUNCTION TYPE: ICD-10-CM

## 2023-07-25 RX ORDER — SILDENAFIL 100 MG/1
100 TABLET, FILM COATED ORAL DAILY PRN
Qty: 6 TABLET | Refills: 0 | Status: SHIPPED | OUTPATIENT
Start: 2023-07-25 | End: 2023-08-14

## 2023-08-14 ENCOUNTER — MYC REFILL (OUTPATIENT)
Dept: FAMILY MEDICINE | Facility: CLINIC | Age: 53
End: 2023-08-14
Payer: COMMERCIAL

## 2023-08-14 DIAGNOSIS — N52.9 ERECTILE DYSFUNCTION, UNSPECIFIED ERECTILE DYSFUNCTION TYPE: ICD-10-CM

## 2023-08-14 RX ORDER — SILDENAFIL 100 MG/1
100 TABLET, FILM COATED ORAL DAILY PRN
Qty: 6 TABLET | Refills: 0 | Status: SHIPPED | OUTPATIENT
Start: 2023-08-14 | End: 2023-09-28

## 2023-08-21 ENCOUNTER — PATIENT OUTREACH (OUTPATIENT)
Dept: CARE COORDINATION | Facility: CLINIC | Age: 53
End: 2023-08-21
Payer: COMMERCIAL

## 2023-08-23 ENCOUNTER — TELEPHONE (OUTPATIENT)
Dept: FAMILY MEDICINE | Facility: CLINIC | Age: 53
End: 2023-08-23
Payer: COMMERCIAL

## 2023-08-23 NOTE — LETTER
August 23, 2023    To  Arsenio Stockton  [address] [place] [address]    Your team at [facility] cares about your health. We have reviewed your chart and based on our findings; we are making the following recommendations to better manage your health.     You are in particular need of attention regarding the following:     Call or MyChart message your clinic to schedule a colonoscopy, schedule/ a FIT Test, or order a Cologuard test. If you are unsure what type of test you need, please call your clinic and speak to clinic staff.   Colon cancer is now the second leading cause of cancer-related deaths in the [facility] for both men and women and there are over 130,000 new cases and 50,000 deaths per year from colon cancer. Colonoscopies can prevent 90-95% of these deaths. Problem lesions can be removed before they ever become cancer. This test is not only looking for cancer, but also getting rid of precancerous lesions.   If you are under/uninsured, we recommend you contact the [employer] Program.[employer] is a free colorectal cancer screening program that provides colonoscopies for eligible under/uninsured [place] men and women. If you are interested in receiving a free colonoscopy, please call [employer] at t [phone] (mention code [employer]) to see if you're eligible. Please have them send us the results.     If you have already completed these items, please contact the clinic via phone or   [employer] so your care team can review and update your records. Thank you for   choosing [facility] for your healthcare needs. For any questions,   concerns, or to schedule an appointment please contact our clinic.    Healthy Regards,      Your [facility] Care Team

## 2023-08-23 NOTE — TELEPHONE ENCOUNTER
Patient Quality Outreach    Patient is due for the following:   Colon Cancer Screening    Next Steps:   Colonoscopy     Type of outreach:    Sent [employer] message.      Questions for provider review:    None           Kiki Hurtado CMA        
Adequate: hears normal conversation without difficulty

## 2023-09-04 ENCOUNTER — PATIENT OUTREACH (OUTPATIENT)
Dept: CARE COORDINATION | Facility: CLINIC | Age: 53
End: 2023-09-04
Payer: COMMERCIAL

## 2023-09-25 DIAGNOSIS — K21.9 GASTROESOPHAGEAL REFLUX DISEASE WITHOUT ESOPHAGITIS: ICD-10-CM

## 2023-09-26 RX ORDER — FAMOTIDINE 20 MG/1
20 TABLET, FILM COATED ORAL 2 TIMES DAILY
Qty: 180 TABLET | Refills: 0 | Status: SHIPPED | OUTPATIENT
Start: 2023-09-26 | End: 2023-11-27

## 2023-09-28 ENCOUNTER — MYC REFILL (OUTPATIENT)
Dept: FAMILY MEDICINE | Facility: CLINIC | Age: 53
End: 2023-09-28
Payer: COMMERCIAL

## 2023-09-28 DIAGNOSIS — N52.9 ERECTILE DYSFUNCTION, UNSPECIFIED ERECTILE DYSFUNCTION TYPE: ICD-10-CM

## 2023-09-28 NOTE — TELEPHONE ENCOUNTER
"Requested Prescriptions   Pending Prescriptions Disp Refills    sildenafil (VIAGRA) 100 MG tablet 6 tablet 0     Sig: Take 1 tablet (100 mg) by mouth daily as needed (ED) 30 min to 4 hrs before sex. Do not use with nitroglycerin, terazosin or doxazosin.       Erectile Dysfuction Protocol Failed - 9/28/2023  3:04 PM        Failed - Recent (12 mo) or future (30 days) visit within the authorizing provider's specialty     Patient has had an office visit with the authorizing provider or a provider within the authorizing providers department within the previous 12 mos or has a future within next 30 days. See \"Patient Info\" tab in inbasket, or \"Choose Columns\" in Meds & Orders section of the refill encounter.              Passed - Absence of nitrates on medication list        Passed - Absence of Alpha Blockers on Med list        Passed - Medication is active on med list        Passed - Patient is age 18 or older             "

## 2023-09-29 RX ORDER — SILDENAFIL 100 MG/1
100 TABLET, FILM COATED ORAL DAILY PRN
Qty: 6 TABLET | Refills: 0 | Status: SHIPPED | OUTPATIENT
Start: 2023-09-29 | End: 2023-11-17

## 2023-10-31 ENCOUNTER — OFFICE VISIT (OUTPATIENT)
Dept: FAMILY MEDICINE | Facility: CLINIC | Age: 53
End: 2023-10-31
Payer: COMMERCIAL

## 2023-10-31 VITALS
TEMPERATURE: 98 F | SYSTOLIC BLOOD PRESSURE: 142 MMHG | HEIGHT: 68 IN | OXYGEN SATURATION: 98 % | BODY MASS INDEX: 44.56 KG/M2 | WEIGHT: 294 LBS | HEART RATE: 77 BPM | RESPIRATION RATE: 16 BRPM | DIASTOLIC BLOOD PRESSURE: 91 MMHG

## 2023-10-31 DIAGNOSIS — M25.562 ACUTE PAIN OF BOTH KNEES: ICD-10-CM

## 2023-10-31 DIAGNOSIS — M25.511 ACUTE PAIN OF RIGHT SHOULDER: ICD-10-CM

## 2023-10-31 DIAGNOSIS — Z02.6 ENCOUNTER RELATED TO WORKER'S COMPENSATION CLAIM: Primary | ICD-10-CM

## 2023-10-31 DIAGNOSIS — M25.561 ACUTE PAIN OF BOTH KNEES: ICD-10-CM

## 2023-10-31 DIAGNOSIS — S89.92XA KNEE INJURY, LEFT, INITIAL ENCOUNTER: ICD-10-CM

## 2023-10-31 PROCEDURE — 99213 OFFICE O/P EST LOW 20 MIN: CPT | Performed by: PHYSICIAN ASSISTANT

## 2023-10-31 ASSESSMENT — PATIENT HEALTH QUESTIONNAIRE - PHQ9
SUM OF ALL RESPONSES TO PHQ QUESTIONS 1-9: 9
10. IF YOU CHECKED OFF ANY PROBLEMS, HOW DIFFICULT HAVE THESE PROBLEMS MADE IT FOR YOU TO DO YOUR WORK, TAKE CARE OF THINGS AT HOME, OR GET ALONG WITH OTHER PEOPLE: SOMEWHAT DIFFICULT
SUM OF ALL RESPONSES TO PHQ QUESTIONS 1-9: 9

## 2023-10-31 ASSESSMENT — PAIN SCALES - GENERAL: PAINLEVEL: SEVERE PAIN (7)

## 2023-10-31 NOTE — COMMUNITY RESOURCES LIST (ENGLISH)
10/31/2023   [facility] [employer]  N/A  For questions about this resource list or additional care needs, please contact your primary care clinic or care manager.  Phone: [phone]   Email: N/A   Address: [address] [place] [id number]   Hours: N/A        Financial Stability       Rent and mortgage payment assistance  1  Community Helping Hand Distance: 10.66 miles      In-Person, Phone/Virtual   [address] [place] [address]  Language: English  Hours: Mon - Sun Appt. Only  Fees: Free   Phone: (801) 651-9213 Email: jim@[employer] Website: http://www.communityHello Healthpinghand.org     2  [place] Out[facility] Distance: 24.57 miles      [address] [place] [address]  Language: English, Bengali  Hours: Mon 9:30 AM - 11:30 AM , Tue 1:30 PM - 7:30 PM , Thu 9:00 AM - 7:00 PM , Fri 9:30 AM - 11:30 AM   Phone: (997) 282-7658 Email: info@"[employer]" Website: http://[email or web address].[email or web address]/          Important Numbers & Websites       Emergency Services   911  [facility] Services   311  Poison Control   (682) 831-4768  Suicide Prevention Lifeline   (197) 737-7141 (TALK)  Child Abuse Hotline   (509) 904-3707 (4-A-Child)  Sexual Assault Hotline   (882) 968-4074 (HOPE)  National Runaway Safeline   (176) 194-7495 (RUNAWAY)  All-Options Talkline   (689) 732-8781  Substance Abuse Referral   (438) 298-8929 (HELP)

## 2023-10-31 NOTE — COMMUNITY RESOURCES LIST (ENGLISH)
10/31/2023   [facility] [employer]  N/A  For questions about this resource list or additional care needs, please contact your primary care clinic or care manager.  Phone: [phone]   Email: N/A   Address: [address] [place] [address]   Hours: N/A        Financial Stability       Rent and mortgage payment assistance  1  Community Helping Hand Distance: 10.66 miles      In-Person, Phone/Virtual   [address] [place] [address]  Language: English  Hours: Mon - Sun Appt. Only  Fees: Free   Phone: (520) 341-5438 Email: jim@[employer] Website: http://www.communityThe Epsilon Projectpinghand.org     2  [place] Outreach Distance: 24.57 miles      [address] [place] [address]  Language: English, Ukrainian  Hours: Mon 9:30 AM - 11:30 AM , Tue 1:30 PM - 7:30 PM , Thu 9:00 AM - 7:00 PM , Fri 9:30 AM - 11:30 AM   Phone: (426) 215-6654 Email: info@[employer] Website: http://[facility].org/          Important Numbers & Websites       Emergency Services   911  [facility] Services   311  Poison Control   (274) 884-5021  Suicide Prevention Lifeline   (333) 662-8074 (TALK)  Child Abuse Hotline   (218) 214-7011 (4-A-Child)  Sexual Assault Hotline   (426) 868-8617 (HOPE)  National Runaway Safeline   (877) 106-4436 (RUNAWAY)  All-Options Talkline   (894) 594-5180  Substance Abuse Referral   (668) 415-7166 (HELP)

## 2023-10-31 NOTE — PROGRESS NOTES
Assessment & Plan     Encounter related to worker's compensation claim  Paperwork completed and sent with patient.  Light duty with seated duty only for the next week.  - PRIMARY CARE FOLLOW-UP SCHEDULING; Future    Knee injury, left, initial encounter  Strain with pain primarily to posterior aspect, stable on exam but discussed limitations of this.  Recommended conservative treatment and close follow up  - PRIMARY CARE FOLLOW-UP SCHEDULING; Future    Acute pain of both knees  Mild pain on the right, more severe on the left.  We will treat with management per above.  Recommended Aleve and Tylenol and reviewed dosing.  Discussed over-the-counter creams.  Recommended compression, elevation, ice.  Patient already has compression brace at home.  - PRIMARY CARE FOLLOW-UP SCHEDULING; Future    Acute pain of right shoulder  Do suspect more from impact but has some limitations on movement at this point.  Lower suspicion for tear but is possible.  - PRIMARY CARE FOLLOW-UP SCHEDULING; Future    Restrictions in place for 1 week, will follow-up in clinic.  Follow-up scheduled.    Tennille Hurd PA-C  [facility]    Judy Cesar is a 52 year old, presenting for the following health issues:  Knee Pain (Left), Shoulder Injury (Right), and Work Comp        10/31/2023    10:24 AM   Additional Questions   Roomed by Kena   Accompanied by self       History of Present Illness       Reason for visit:  Fall at Work  Symptom onset:  1-3 days ago  Symptoms include:  Sore knees and sore shoulders  Symptom intensity:  Moderate  Symptom progression:  Staying the same  Had these symptoms before:  Yes  Has tried/received treatment for these symptoms:  No    He eats 2-3 servings of fruits and vegetables daily.He consumes 2 sweetened beverage(s) daily.He exercises with enough effort to increase his heart rate 60 or more minutes per day.  He exercises with enough effort to increase his heart rate 3 or  "less days per week. He is missing 2 dose(s) of medications per week.  He is not taking prescribed medications regularly due to remembering to take.       Patient had a fall at work yesterday.  He works delivering packages for [employer] and was walking outside when he stepped down a single step and felt a sudden, severe and painful pop in his left knee.  He felt himself falling over and stumbled to try and catch himself, feeling some right knee pain.  He then fell onto his right shoulder.  Did not brace his fall as he was holding a package and phone.  He was able to get up by his self.  More generalized pain this morning.  Left knee pain has been most bothersome.  Pain to bilateral sides as well as posterior aspect of the knee.  Some radiation up the back of his leg.  No distal numbness or tingling.  Some instability with walking.  Has not tried anything for treatment.  Right shoulder pain feels deep, worse with moving have some difficulty raising his arm completely.  No distal numbness, tingling, weakness.    At baseline has some mild bilateral knee pain for which he will use over-the-counter medication, creams, braces.  Has never had any surgeries or injections to his knee or affected shoulder.      Review of Systems         Objective    BP (!) 142/91 (BP Location: Left arm, Patient Position: Sitting, Cuff Size: Adult Large)   Pulse 77   Temp 98  F (36.7  C) (Oral)   Resp 16   Ht 1.727 m (5' 7.99\")   Wt 133.4 kg (294 lb)   SpO2 98%   BMI 44.71 kg/m    Body mass index is 44.71 kg/m .  Physical Exam   GENERAL: healthy, alert and no distress  MS: No acute deformities.  Mild effusion to bilateral knees.  No palpable bony tenderness.  Full active range of motion to bilateral knees, pain triggered with flexion of the left knee.  Negative anterior and posterior drawer, Paulo, valgus and varus strain bilaterally.  Right shoulder with anterior tenderness as well as tenderness to thoracic paraspinal musculature " without spasm.  No midline spinal tenderness.  Abduction limited secondary to pain.  Pain triggered by both anterior and exterior rotation.  Distal strength intact.  SKIN: no suspicious lesions or rashes                      Answers submitted by the patient for this visit:  Patient Health Questionnaire (Submitted on 10/31/2023)  If you checked off any problems, how difficult have these problems made it for you to do your work, take care of things at home, or get along with other people?: Somewhat difficult  PHQ9 TOTAL SCORE: 9  General Questionnaire (Submitted on 10/31/2023)  Chief Complaint: Chronic problems general questions HPI Form  How many servings of fruits and vegetables do you eat daily?: 2-3  On average, how many sweetened beverages do you drink each day (Examples: soda, juice, sweet tea, etc.  Do NOT count diet or artificially sweetened beverages)?: 2  How many minutes a day do you exercise enough to make your heart beat faster?: 60 or more  How many days a week do you exercise enough to make your heart beat faster?: 3 or less  How many days per week do you miss taking your medication?: 2  What makes it hard for you to take your medication every day?: remembering to take  General Concern (Submitted on 10/31/2023)  Chief Complaint: Chronic problems general questions HPI Form  What is the reason for your visit today?: Fall at Work  When did your symptoms begin?: 1-3 days ago  What are your symptoms?: Sore knees and sore shoulders  How would you describe these symptoms?: Moderate  Are your symptoms:: Staying the same  Have you had these symptoms before?: Yes  Have you tried or received treatment for these symptoms before?: No

## 2023-10-31 NOTE — LETTER
REPORT OF WORK COMP    [address] [address]  [phone]      PATIENT DATA    Employee Name: Arsenio Stockton      : 1970     SS#: [id number]    Work related injury: Yes  Employer at time of injury: [employer]   Employer contact & phone:   Employed elsewhere? No  Workers' Compensation Carrier/Managed Care Plan:      Today's date: 10/31/2023  Date of injury: 10/30/2023  Date of first visit: 10/31/2023    PROVIDER EVALUATION: Please fill in as needed.  Please give copy to employee for employer.    (S89.92XA) Knee injury, left, initial encounter  (M25.561,  M25.562) Acute pain of both knees  (M25.511) Acute pain of right shoulder      2. Treatment: Rest, ice, compression, elevation.  3. Medication: Over-the-counter medications  NOTE: When ordering a medication, [place] Rules require Work Comp or WC on prescriptions.    4. No work from 10/31/2023.  5. Return to work date: 2023  ** WITH RESTRICTIONS? Yes, with work restrictions: Light duty, seated duty only DURATION OF LIMITATIONS: 2023        Maximum Medical Improvement (Date): NA  Any Permanent Partial Disability? Deferred to future exam/consult.    Provider comments:     Medical Examiner: Tennille Hurd          License or registration: CLARITA    Next appointment: 1 week    CC: Employer, Managed Care Plan/Payor, Patient

## 2023-10-31 NOTE — PATIENT INSTRUCTIONS
Rest for knee as much as possible, avoid prolonged standing, walking or stairs.  Use ice for the next few days and try to keep your knee elevated to help with swelling.  Use a compression brace to help with swelling as well.    You can take up to 1000 mg of acetaminophen (Tylenol) every 6 hours.  Do not exceed 4000 mg in 24 hours.  You can take up to 2 Aleve every 12 hours.  You can also use over-the-counter pain creams such as Voltaren, IcyHot or capsaicin.    Schedule a follow-up visit in 1 week for a recheck.

## 2023-11-07 ENCOUNTER — OFFICE VISIT (OUTPATIENT)
Dept: FAMILY MEDICINE | Facility: CLINIC | Age: 53
End: 2023-11-07
Payer: COMMERCIAL

## 2023-11-07 VITALS
BODY MASS INDEX: 44.77 KG/M2 | HEIGHT: 68 IN | TEMPERATURE: 97.7 F | HEART RATE: 79 BPM | WEIGHT: 295.4 LBS | OXYGEN SATURATION: 95 % | DIASTOLIC BLOOD PRESSURE: 86 MMHG | SYSTOLIC BLOOD PRESSURE: 137 MMHG

## 2023-11-07 DIAGNOSIS — Z02.6 ENCOUNTER RELATED TO WORKER'S COMPENSATION CLAIM: Primary | ICD-10-CM

## 2023-11-07 DIAGNOSIS — S89.92XD LEFT KNEE INJURY, SUBSEQUENT ENCOUNTER: ICD-10-CM

## 2023-11-07 PROCEDURE — 99213 OFFICE O/P EST LOW 20 MIN: CPT | Performed by: PHYSICIAN ASSISTANT

## 2023-11-07 ASSESSMENT — PAIN SCALES - GENERAL: PAINLEVEL: NO PAIN (0)

## 2023-11-07 NOTE — PATIENT INSTRUCTIONS
See how you are feeling without bracing and Aleve.  You can still use these as needed.  If you are not having improvement to your baseline over the next 2 weeks follow-up in clinic for a recheck.  Next step would be physical therapy.

## 2023-11-07 NOTE — LETTER
REPORT OF WORK COMP    [address] [address]  [phone]      PATIENT DATA    Employee Name: Arsenio Stockton      : 1970     SS#: [id number]    Work related injury: Yes  Employer at time of injury: [employer]  Employer contact & phone:   Employed elsewhere? Yes  Workers' Compensation Carrier/Managed Care Plan:      Today's date: 2023  Date of injury: 10/30/2023  Date of first visit: 10/31/2023    PROVIDER EVALUATION: Please fill in as needed.  Please give copy to employee for employer.    1. Diagnosis: (Z02.6) Encounter related to worker's compensation claim  (primary encounter diagnosis)    (S89.92XD) Left knee injury, subsequent encounter        2. Treatment: PRN Compression.  3. Medication: PRN Aleve  NOTE: When ordering a medication, [place] Rules require [place] Comp or WC on prescriptions.  4. Return to work date: 2023   ** WITH RESTRICTIONS? No restrictions      RESTRICTIONS: Unlimited unless listed.  Restrictions apply to home and leisure also.  If work restrictions is not available, the employee is totally disabled.    Maximum Medical Improvement (Date): NA  Any Permanent Partial Disability? Deferred to future exam/consult.    Provider comments: Follow up in 2 weeks if not improved    Medical Examiner: Tennille Hurd PA-C on 2023 at 11:14 AM    Next appointment: As needed    CC: Employer, Managed Care Plan/Payor, Patient

## 2023-11-07 NOTE — PROGRESS NOTES
Assessment & Plan     Encounter related to worker's compensation claim  Workability form completed.  Actually working at a different job where he does not do bothersome activities such as walking distances or stairs, no restrictions at this point.  Follow-up as needed in 2 weeks if not having complete resolution.    Left knee injury, subsequent encounter  Improving as expected.  Discussed seeing how he is doing without the Aleve in the mornings and bracing as needed.  If not having improvement to baseline over the next 2 weeks recommended recheck in clinic.  May need physical therapy for neck step.          Tennille Hurd PA-C  [facility] BENNY Cesar is a 52 year old, presenting for the following health issues:  Musculoskeletal Problem (Left knee injury follow up )        11/7/2023    10:45 AM   Additional Questions   Roomed by Thaddeus HINES         Pain History:  When did you first notice your pain? Left knee   Have you seen anyone else for your pain? No  How has your pain affected your ability to work? Not applicable  Where in your body do you have pain? Musculoskeletal problem/pain  Onset/Duration: 1-3 days  Description  Location: knee - left  Joint Swelling: No  Redness: No  Pain: No  Warmth: No  Intensity:  mild  Progression of Symptoms:  improving  Accompanying signs and symptoms:   Fevers: No  Numbness/tingling/weakness: No  History  Trauma to the area: Fall  Recent illness:  No  Previous similar problem: No  Previous evaluation:  No  Precipitating or alleviating factors:  Aggravating factors include: none  Therapies tried and outcome: Aleve and bracing with improvement.    Follow-up on work-related injury.  Has been 1 week since his visit.  Having expected improvement.  Right shoulder feels improved besides if he sleeps on it.  Motion has returned.  Still gets some clicking and popping but not painful.  Right knee has improved to baseline.  Left knee  "seems to be appropriately improving.  He is wearing a compression brace most of the time.  Was able to climb the stairs today without pain.  Has still been taking 2 Aleve every morning and uses Voltaren cream as needed.  He actually got a new job where he is seated at a desk and has not had any issues there.      Objective    /86 (BP Location: Left arm, Patient Position: Sitting, Cuff Size: Adult Large)   Pulse 79   Temp 97.7  F (36.5  C) (Tympanic)   Ht 1.727 m (5' 7.99\")   Wt 134 kg (295 lb 6.4 oz)   SpO2 95%   BMI 44.93 kg/m    Body mass index is 44.93 kg/m .  Physical Exam   Physical Exam   GENERAL: healthy, alert and no distress  MS: No acute deformities.   No palpable bony tenderness.  Full active range of motion to bilateral knees, pain triggered with flexion of the left knee. Right shoulder with no tenderness.  Full range of motion.  Negative rotator cuff testing.  Gait antalgic.  Distal strength intact.  SKIN: no suspicious lesions or rashes      "

## 2023-11-12 ENCOUNTER — MYC REFILL (OUTPATIENT)
Dept: FAMILY MEDICINE | Facility: CLINIC | Age: 53
End: 2023-11-12
Payer: COMMERCIAL

## 2023-11-12 DIAGNOSIS — J31.0 CHRONIC RHINITIS: ICD-10-CM

## 2023-11-12 DIAGNOSIS — I10 BENIGN ESSENTIAL HYPERTENSION: ICD-10-CM

## 2023-11-13 RX ORDER — CETIRIZINE HYDROCHLORIDE 10 MG/1
10 TABLET ORAL EVERY EVENING
Qty: 30 TABLET | Refills: 0 | Status: SHIPPED | OUTPATIENT
Start: 2023-11-13 | End: 2024-03-25

## 2023-11-13 RX ORDER — CHLORTHALIDONE 25 MG/1
25 TABLET ORAL DAILY
Qty: 30 TABLET | Refills: 0 | Status: SHIPPED | OUTPATIENT
Start: 2023-11-13 | End: 2023-11-27

## 2023-11-13 NOTE — TELEPHONE ENCOUNTER
"Medication is being filled for 1 time refill only due to:  Patient needs to be seen because it has been more than one year since last visit.     Requested Prescriptions   Pending Prescriptions Disp Refills    chlorthalidone (HYGROTON) 25 MG tablet 30 tablet 0     Sig: Take 1 tablet (25 mg) by mouth daily       Diuretics (Including Combos) Protocol Failed - 11/12/2023  8:10 PM        Failed - Normal serum creatinine on file in past 12 months     Recent Labs   Lab Test 09/20/22  1101   CR 0.98              Failed - Normal serum potassium on file in past 12 months     Recent Labs   Lab Test 09/20/22  1101   POTASSIUM 3.6                    Failed - Normal serum sodium on file in past 12 months     Recent Labs   Lab Test 09/20/22  1101                 Passed - Blood pressure under 140/90 in past 12 months     BP Readings from Last 3 Encounters:   11/07/23 137/86   10/31/23 (!) 142/91   09/20/22 138/80                 Passed - Recent (12 mo) or future (30 days) visit within the authorizing provider's specialty     Patient has had an office visit with the authorizing provider or a provider within the authorizing providers department within the previous 12 mos or has a future within next 30 days. See \"Patient Info\" tab in inbasket, or \"Choose Columns\" in Meds & Orders section of the refill encounter.              Passed - Medication is active on med list        Passed - Patient is age 18 or older          cetirizine (ZYRTEC) 10 MG tablet 30 tablet 0     Sig: Take 1 tablet (10 mg) by mouth every evening       Antihistamines Protocol Passed - 11/12/2023  8:10 PM        Passed - Patient is 3-64 years of age     Apply weight-based dosing for peds patients age 3 - 12 years of age.    Forward request to provider for patients under the age of 3 or over the age of 64.          Passed - Recent (12 mo) or future (30 days) visit within the authorizing provider's specialty     Patient has had an office visit with the authorizing " "provider or a provider within the authorizing providers department within the previous 12 mos or has a future within next 30 days. See \"Patient Info\" tab in inbasket, or \"Choose Columns\" in Meds & Orders section of the refill encounter.              Passed - Medication is active on med list                 Coleen Peters RN 11/13/23 12:35 PM   "

## 2023-11-17 ENCOUNTER — MYC REFILL (OUTPATIENT)
Dept: FAMILY MEDICINE | Facility: CLINIC | Age: 53
End: 2023-11-17
Payer: COMMERCIAL

## 2023-11-17 DIAGNOSIS — N52.9 ERECTILE DYSFUNCTION, UNSPECIFIED ERECTILE DYSFUNCTION TYPE: ICD-10-CM

## 2023-11-17 RX ORDER — SILDENAFIL 100 MG/1
100 TABLET, FILM COATED ORAL DAILY PRN
Qty: 6 TABLET | Refills: 1 | Status: SHIPPED | OUTPATIENT
Start: 2023-11-17 | End: 2024-01-02

## 2023-11-17 NOTE — TELEPHONE ENCOUNTER
"Prescription approved per [facility] Refill Protocol.       Requested Prescriptions   Pending Prescriptions Disp Refills    sildenafil (VIAGRA) 100 MG tablet 6 tablet 1     Sig: Take 1 tablet (100 mg) by mouth daily as needed (ED) 30 min to 4 hrs before sex. Do not use with nitroglycerin, terazosin or doxazosin.       Erectile Dysfuction Protocol Passed - 11/17/2023  6:58 AM        Passed - Absence of nitrates on medication list        Passed - Absence of Alpha Blockers on Med list        Passed - Recent (12 mo) or future (30 days) visit within the authorizing provider's specialty     Patient has had an office visit with the authorizing provider or a provider within the authorizing providers department within the previous 12 mos or has a future within next 30 days. See \"Patient Info\" tab in inbasket, or \"Choose Columns\" in Meds & Orders section of the refill encounter.              Passed - Medication is active on med list        Passed - Patient is age 18 or older                 Coleen Peters RN 11/17/23 12:16 PM    "

## 2023-11-21 NOTE — TELEPHONE ENCOUNTER
"Requested Prescriptions   Pending Prescriptions Disp Refills     chlorthalidone (HYGROTON) 25 MG tablet [Pharmacy Med Name: CHLORTHALIDONE 25 MG TABLET] 90 tablet 1     Sig: TAKE 2 TABLETS BY MOUTH EVERY DAY       Diuretics (Including Combos) Protocol Failed - 12/27/2019  8:00 AM        Failed - Normal serum creatinine on file in past 12 months     Recent Labs   Lab Test 10/01/18  1402   CR 0.91              Failed - Normal serum potassium on file in past 12 months     Recent Labs   Lab Test 10/01/18  1402   POTASSIUM 3.8                    Failed - Normal serum sodium on file in past 12 months     Recent Labs   Lab Test 10/01/18  1402                 Passed - Blood pressure under 140/90 in past 12 months     BP Readings from Last 3 Encounters:   07/26/19 130/80   10/29/18 130/80   10/22/18 128/84                 Passed - Recent (12 mo) or future (30 days) visit within the authorizing provider's specialty     Patient has had an office visit with the authorizing provider or a provider within the authorizing providers department within the previous 12 mos or has a future within next 30 days. See \"Patient Info\" tab in inbasket, or \"Choose Columns\" in Meds & Orders section of the refill encounter.              Passed - Medication is active on med list        Passed - Patient is age 18 or older        Last Written Prescription Date:  9/25/2019  Last Fill Quantity: 90,  # refills: 1   Last office visit: 7/26/2019 with prescribing provider:  Misha   Future Office Visit:      " ----- Message from José Luis Castellanos MD sent at 11/21/2023 10:25 AM CST -----  Labs better   TSH will remain suppressed as expeted for a while   I will discuss labs rajat ppt    Please make sure he continues the methimazole at same dose  Repeat tsh and free t4 in 8 wks and free t3 in 8wks

## 2023-11-27 ENCOUNTER — OFFICE VISIT (OUTPATIENT)
Dept: FAMILY MEDICINE | Facility: CLINIC | Age: 53
End: 2023-11-27
Payer: COMMERCIAL

## 2023-11-27 ENCOUNTER — LAB (OUTPATIENT)
Dept: LAB | Facility: CLINIC | Age: 53
End: 2023-11-27
Payer: COMMERCIAL

## 2023-11-27 VITALS
OXYGEN SATURATION: 98 % | SYSTOLIC BLOOD PRESSURE: 138 MMHG | WEIGHT: 298 LBS | BODY MASS INDEX: 45.16 KG/M2 | TEMPERATURE: 97.7 F | HEART RATE: 77 BPM | RESPIRATION RATE: 16 BRPM | HEIGHT: 68 IN | DIASTOLIC BLOOD PRESSURE: 70 MMHG

## 2023-11-27 DIAGNOSIS — I10 BENIGN ESSENTIAL HYPERTENSION: ICD-10-CM

## 2023-11-27 DIAGNOSIS — Z13.1 SCREENING FOR DIABETES MELLITUS: ICD-10-CM

## 2023-11-27 DIAGNOSIS — Z13.220 SCREENING FOR LIPOID DISORDERS: ICD-10-CM

## 2023-11-27 DIAGNOSIS — Z12.11 SCREEN FOR COLON CANCER: ICD-10-CM

## 2023-11-27 DIAGNOSIS — Z00.00 ROUTINE GENERAL MEDICAL EXAMINATION AT A HEALTH CARE FACILITY: Primary | ICD-10-CM

## 2023-11-27 DIAGNOSIS — K21.9 GASTROESOPHAGEAL REFLUX DISEASE WITHOUT ESOPHAGITIS: ICD-10-CM

## 2023-11-27 DIAGNOSIS — E66.01 MORBID OBESITY (H): ICD-10-CM

## 2023-11-27 LAB
ANION GAP SERPL CALCULATED.3IONS-SCNC: 13 MMOL/L (ref 7–15)
BUN SERPL-MCNC: 17.8 MG/DL (ref 6–20)
CALCIUM SERPL-MCNC: 9.9 MG/DL (ref 8.6–10)
CHLORIDE SERPL-SCNC: 101 MMOL/L (ref 98–107)
CHOLEST SERPL-MCNC: 187 MG/DL
CREAT SERPL-MCNC: 0.99 MG/DL (ref 0.67–1.17)
DEPRECATED HCO3 PLAS-SCNC: 26 MMOL/L (ref 22–29)
EGFRCR SERPLBLD CKD-EPI 2021: >90 ML/MIN/1.73M2
GLUCOSE SERPL-MCNC: 87 MG/DL (ref 70–99)
HBA1C MFR BLD: 5.7 % (ref 0–5.6)
HDLC SERPL-MCNC: 45 MG/DL
LDLC SERPL CALC-MCNC: 102 MG/DL
NONHDLC SERPL-MCNC: 142 MG/DL
POTASSIUM SERPL-SCNC: 3.7 MMOL/L (ref 3.4–5.3)
SODIUM SERPL-SCNC: 140 MMOL/L (ref 135–145)
TRIGL SERPL-MCNC: 198 MG/DL

## 2023-11-27 PROCEDURE — 99214 OFFICE O/P EST MOD 30 MIN: CPT | Mod: 25 | Performed by: NURSE PRACTITIONER

## 2023-11-27 PROCEDURE — 80048 BASIC METABOLIC PNL TOTAL CA: CPT

## 2023-11-27 PROCEDURE — 99396 PREV VISIT EST AGE 40-64: CPT | Performed by: NURSE PRACTITIONER

## 2023-11-27 PROCEDURE — 80061 LIPID PANEL: CPT

## 2023-11-27 PROCEDURE — 83036 HEMOGLOBIN GLYCOSYLATED A1C: CPT

## 2023-11-27 PROCEDURE — 36415 COLL VENOUS BLD VENIPUNCTURE: CPT

## 2023-11-27 RX ORDER — FAMOTIDINE 20 MG/1
20 TABLET, FILM COATED ORAL 2 TIMES DAILY
Qty: 180 TABLET | Refills: 3 | Status: SHIPPED | OUTPATIENT
Start: 2023-11-27

## 2023-11-27 RX ORDER — CHLORTHALIDONE 25 MG/1
25 TABLET ORAL DAILY
Qty: 30 TABLET | Refills: 3 | Status: SHIPPED | OUTPATIENT
Start: 2023-11-27 | End: 2024-03-18

## 2023-11-27 ASSESSMENT — ENCOUNTER SYMPTOMS
NERVOUS/ANXIOUS: 0
MYALGIAS: 0
EYE PAIN: 0
COUGH: 0
CONSTIPATION: 0
SORE THROAT: 0
ARTHRALGIAS: 1
SHORTNESS OF BREATH: 0
DIARRHEA: 0
FEVER: 0
PALPITATIONS: 0
HEMATURIA: 0
WEAKNESS: 0
PARESTHESIAS: 0
NAUSEA: 0
HEMATOCHEZIA: 0
DIZZINESS: 0
FREQUENCY: 0
JOINT SWELLING: 1
HEARTBURN: 1
HEADACHES: 0
CHILLS: 0
DYSURIA: 0
ABDOMINAL PAIN: 0

## 2023-11-27 ASSESSMENT — PAIN SCALES - GENERAL: PAINLEVEL: NO PAIN (0)

## 2023-11-27 NOTE — COMMUNITY RESOURCES LIST (ENGLISH)
11/27/2023   [facility] [employer]  N/A  For questions about this resource list or additional care needs, please contact your primary care clinic or care manager.  Phone: [phone]   Email: N/A   Address: [address] [place] [address]   Hours: N/A        Financial Stability       Rent and mortgage payment assistance  1  Community Helping Hand Distance: 10.65 miles      In-Person, Phone/Virtual   [address] [place] [address]  Language: English  Hours: Mon - Sun Appt. Only  Fees: Free   Phone: (545) 151-6429 Email: jim@[address].Q.ME Website: http://www.communityAmerican Efficient.org     2  [facility] [place] ([facility] Office - Emergency Housing Assistance - Rent and mortgage payment assistance Distance: 10.78 miles      In-Person   [address] [place] [address]  Language: English  Hours: Mon - Fri 8:00 AM - 4:30 PM  Fees: Free   Phone: (532) 494-3549 Email: johnny@[facility] Center[facility] Website: https://www.[facility].AdTheorent/agency-information          Important Numbers & Websites       Emergency Services   911  City Services   311  Poison Control   (811) 183-5239  Suicide Prevention Lifeline   (205) 301-8202 (TALK)  Child Abuse Hotline   (404) 837-3346 (4-A-Child)  Sexual Assault Hotline   (784) 975-3775 (HOPE)  National Runaway Safeline   (798) 837-4663 (RUNAWAY)  All-Options Talkline   (407) 671-5653  Substance Abuse Referral   (631) 704-2553 (HELP)

## 2023-11-27 NOTE — COMMUNITY RESOURCES LIST (ENGLISH)
11/27/2023   [facility] - Outpatient Clinics  N/A  For additional resource needs, please contact your health insurance member services or your primary care team.  Phone: [phone]   Email: N/A   Address: [facility] [place] [address]   Hours: N/A        Financial Stability       Rent and mortgage payment assistance  1  Community Helping Hand Distance: 10.65 miles      In-Person, Phone/Virtual   [address] [place] [address]  Language: English  Hours: Mon - Sun Appt. Only  Fees: Free   Phone: (361) 120-6719 Email: jim@[employer].CaseRails Website: http://www.TaxiPixi.org     2  [facility] [place] ([facility]) [facility] Office - Emergency Housing Assistance - Rent and mortgage payment assistance Distance: 10.78 miles      In-Person   [address] [place] [address]  Language: English  Hours: Mon - Fri 8:00 AM - 4:30 PM  Fees: Free   Phone: (124) 383-2412 Email: johnny@[employer] Website: https://www.[employer].Drillster/agency-information          Important Numbers & Websites       [facility]   211 211itedway.org  Poison Control   (262) 454-7959 [place]  Suicide and Crisis Lifeline   [address] [place], [place] [address]line.org  Childhelp [place] Child Abuse Hotline   160.823.6976 Childhelphotline.org  [place] Sexual Assault Hotline   (976) 355-5600 (HOPE) Rainn.org  National Runaway Safeline   (584) 780-5402 (RUNAWAY) [place] Healthrunaway.org  Pregnancy & Postpartum Support [place]   Call/text 171-606-7487 Ppsupportmn.org  Substance Abuse National Helpline ([facility]   190-085-HELP (1031) Findtreatment.gov  Emergency Services   911

## 2023-11-27 NOTE — PROGRESS NOTES
"SUBJECTIVE:   Arsenio is a 53 year old, presenting for the following:  Physical        11/27/2023     3:30 PM   Additional Questions   Roomed by        Healthy Habits:     Getting at least 3 servings of Calcium per day:  Yes    Bi-annual eye exam:  NO    Dental care twice a year:  NO    Sleep apnea or symptoms of sleep apnea:  None    Diet:  Regular (no restrictions)    Frequency of exercise:  1 day/week    Duration of exercise:  15-30 minutes    Taking medications regularly:  Yes    Medication side effects:  None    Additional concerns today:  No      Today's PHQ-2 Score:       11/27/2023     3:20 PM   PHQ-2 ( 1999 [employer])   Q1: Little interest or pleasure in doing things 0   Q2: Feeling down, depressed or hopeless 0   PHQ-2 Score 0   Q1: Little interest or pleasure in doing things Not at all   Q2: Feeling down, depressed or hopeless Not at all   PHQ-2 Score 0                 Joint or Musculoskeletal Pain  Duration of complaint: left knee pain - recheck          Social History     Tobacco Use    Smoking status: Former     Types: Dip, chew, snus or snuff    Smokeless tobacco: Current     Types: Chew   Substance Use Topics    Alcohol use: Not Currently             11/27/2023     3:20 PM   Alcohol Use   Prescreen: >3 drinks/day or >7 drinks/week? No       Last PSA: No results found for: \"PSA\"    Reviewed orders with patient. Reviewed health maintenance and updated orders accordingly - Yes  BP Readings from Last 3 Encounters:   11/27/23 138/70   11/07/23 137/86   10/31/23 (!) 142/91    Wt Readings from Last 3 Encounters:   11/27/23 135.2 kg (298 lb)   11/07/23 134 kg (295 lb 6.4 oz)   10/31/23 133.4 kg (294 lb)                  Patient Active Problem List   Diagnosis    CARDIOVASCULAR SCREENING; LDL GOAL LESS THAN 160    Elevated blood pressure reading without diagnosis of hypertension    Benign essential hypertension    Sleep apnea    Morbid obesity (H)    GERD (gastroesophageal reflux disease)     No past surgical " history on file.    Social History     Tobacco Use    Smoking status: Former     Types: Dip, chew, snus or snuff    Smokeless tobacco: Current     Types: Chew   Substance Use Topics    Alcohol use: Not Currently     Family History   Problem Relation Age of Onset    Unknown/Adopted Maternal Grandmother     Unknown/Adopted Maternal Grandfather          Current Outpatient Medications   Medication Sig Dispense Refill    allopurinol (ZYLOPRIM) 100 MG tablet Take 1 tablet (100 mg) by mouth daily 90 tablet 0    cetirizine (ZYRTEC) 10 MG tablet Take 1 tablet (10 mg) by mouth every evening 30 tablet 0    chlorthalidone (HYGROTON) 25 MG tablet Take 1 tablet (25 mg) by mouth daily 30 tablet 3    famotidine (PEPCID) 20 MG tablet Take 1 tablet (20 mg) by mouth 2 times daily 180 tablet 3    sildenafil (VIAGRA) 100 MG tablet Take 1 tablet (100 mg) by mouth daily as needed (ED) 30 min to 4 hrs before sex. Do not use with nitroglycerin, terazosin or doxazosin. 6 tablet 1     No Known Allergies  Recent Labs   Lab Test 09/20/22  1101 07/27/21  1621 12/30/19  1000 10/01/18  1402 10/01/18  1401 04/10/17  1435   A1C  --   --   --  5.5  --   --    * 97  --   --   --   --    HDL 47 54  --   --   --   --    TRIG 109 144  --   --   --   --    ALT  --   --   --  37  --  46   CR 0.98 0.91 1.01 0.91  --  0.95   GFRESTIMATED >90 >90 88 90  --  86   GFRESTBLACK  --   --  >90 >90  --  >90  African American GFR Calc     POTASSIUM 3.6 3.7 3.7 3.8  --  3.1*   TSH  --   --   --   --  0.68 0.39*        Reviewed and updated as needed this visit by clinical staff   Tobacco  Allergies  Meds              Reviewed and updated as needed this visit by Provider                 Past Medical History:   Diagnosis Date    GERD (gastroesophageal reflux disease)     Hypertension     Sleep apnea       No past surgical history on file.    Review of Systems   Constitutional:  Negative for chills and fever.   HENT:  Negative for congestion, ear pain, hearing  "loss and sore throat.    Eyes:  Negative for pain and visual disturbance.   Respiratory:  Negative for cough and shortness of breath.    Cardiovascular:  Negative for chest pain, palpitations and peripheral edema.   Gastrointestinal:  Positive for heartburn. Negative for abdominal pain, constipation, diarrhea, hematochezia and nausea.   Genitourinary:  Positive for impotence. Negative for dysuria, frequency, genital sores, hematuria, penile discharge and urgency.   Musculoskeletal:  Positive for arthralgias and joint swelling. Negative for myalgias.   Skin:  Negative for rash.   Neurological:  Negative for dizziness, weakness, headaches and paresthesias.   Psychiatric/Behavioral:  Negative for mood changes. The patient is not nervous/anxious.          OBJECTIVE:   /70   Pulse 77   Temp 97.7  F (36.5  C)   Resp 16   Ht 1.727 m (5' 7.99\")   Wt 135.2 kg (298 lb)   SpO2 98%   BMI 45.32 kg/m      Physical Exam  GENERAL: healthy, alert and no distress  EYES: Eyes grossly normal to inspection, PERRL and conjunctivae and sclerae normal  HENT: ear canals and TM's normal, nose and mouth without ulcers or lesions  NECK: no adenopathy, no asymmetry, masses, or scars and thyroid normal to palpation  RESP: lungs clear to auscultation - no rales, rhonchi or wheezes  CV: regular rate and rhythm, normal S1 S2, no S3 or S4, no murmur, click or rub, no peripheral edema and peripheral pulses strong  ABDOMEN: soft, obese, non tender, no hepatosplenomegaly, no masses and bowel sounds normal  MS: no gross musculoskeletal defects noted, no edema  SKIN: no suspicious lesions or rashes  NEURO: Normal strength and tone, mentation intact and speech normal  PSYCH: mentation appears normal, affect normal/bright    Diagnostic Test Results:  Labs reviewed in Epic  No results found for this or any previous visit (from the past 24 hour(s)).    ASSESSMENT/PLAN:       ICD-10-CM    1. Routine general medical examination at a health [facility] " facility  Z00.00       2. Benign essential hypertension  I10 chlorthalidone (HYGROTON) 25 MG tablet     Basic metabolic panel  (Ca, Cl, CO2, Creat, Gluc, K, Na, BUN)      3. Gastroesophageal reflux disease without esophagitis  K21.9 famotidine (PEPCID) 20 MG tablet      4. Screen for colon cancer  Z12.11 Colonoscopy Screening  Referral      5. Morbid obesity (H)  E66.01       6. Screening for lipoid disorders  Z13.220 Lipid panel reflex to direct LDL Fasting      7. Screening for diabetes mellitus  Z13.1 Hemoglobin A1c          Continue all efforts at weight loss, that will help his knee pain, blood pressure, overall health.   He will notify if he wishes to try PT for left knee.  Screening labs pending.  Blood pressure controlled, continue monitoring and current medications.  GERD controlled with pepcid.  Patient Instructions   Will be notified of pending labs.  Return for immunizations.  Do colonoscopy.      Preventive Health Recommendations  Male Ages 50 - 64    Yearly exam:             See your health care provider every year in order to  o   Review health changes.   o   Discuss preventive care.    o   Review your medicines if your doctor has prescribed any.   Have a cholesterol test every 5 years, or more frequently if you are at risk for high cholesterol/heart disease.   Have a diabetes test (fasting glucose) every three years. If you are at risk for diabetes, you should have this test more often.   Have a colonoscopy at age 45, or have a yearly FIT test (stool test). These exams will check for colon cancer.    Talk with your health care provider about whether or not a prostate cancer screening test (PSA) is right for you.  You should be tested each year for STDs (sexually transmitted diseases), if you re at risk.     Shots: Get a flu shot each year. Get a tetanus shot every 10 years.     Nutrition:  Eat at least 5 servings of fruits and vegetables daily.   Eat whole-grain bread, whole-wheat pasta and  "brown rice instead of white grains and rice.   Get adequate Calcium and Vitamin D.     Lifestyle  Exercise for at least 150 minutes a week (30 minutes a day, 5 days a week). This will help you control your weight and prevent disease.   Limit alcohol to one drink per day.   No smoking.   Wear sunscreen to prevent skin cancer.   See your dentist every six months for an exam and cleaning.   See your eye doctor every 1 to 2 years.          COUNSELING:   Reviewed preventive health counseling, as reflected in patient instructions       Regular exercise       Healthy diet/nutrition       Colorectal cancer screening      BMI:   Estimated body mass index is 45.32 kg/m  as calculated from the following:    Height as of this encounter: 1.727 m (5' 7.99\").    Weight as of this encounter: 135.2 kg (298 lb).   Weight management plan: Discussed healthy diet and exercise guidelines      He reports that he has quit smoking. His smoking use included dip, chew, snus or snuff. His smokeless tobacco use includes chew.            RODRICK Bustillos CNP  [facility]  "

## 2023-11-27 NOTE — PATIENT INSTRUCTIONS
Will be notified of pending labs.  Return for immunizations.  Do colonoscopy.      Preventive Health Recommendations  Male Ages 50 - 64    Yearly exam:             See your health care provider every year in order to  o   Review health changes.   o   Discuss preventive care.    o   Review your medicines if your doctor has prescribed any.   Have a cholesterol test every 5 years, or more frequently if you are at risk for high cholesterol/heart disease.   Have a diabetes test (fasting glucose) every three years. If you are at risk for diabetes, you should have this test more often.   Have a colonoscopy at age 45, or have a yearly FIT test (stool test). These exams will check for colon cancer.    Talk with your health care provider about whether or not a prostate cancer screening test (PSA) is right for you.  You should be tested each year for STDs (sexually transmitted diseases), if you re at risk.     Shots: Get a flu shot each year. Get a tetanus shot every 10 years.     Nutrition:  Eat at least 5 servings of fruits and vegetables daily.   Eat whole-grain bread, whole-wheat pasta and brown rice instead of white grains and rice.   Get adequate Calcium and Vitamin D.     Lifestyle  Exercise for at least 150 minutes a week (30 minutes a day, 5 days a week). This will help you control your weight and prevent disease.   Limit alcohol to one drink per day.   No smoking.   Wear sunscreen to prevent skin cancer.   See your dentist every six months for an exam and cleaning.   See your eye doctor every 1 to 2 years.

## 2023-12-08 ENCOUNTER — IMMUNIZATION (OUTPATIENT)
Dept: FAMILY MEDICINE | Facility: CLINIC | Age: 53
End: 2023-12-08
Payer: COMMERCIAL

## 2023-12-08 DIAGNOSIS — Z23 ENCOUNTER FOR IMMUNIZATION: Primary | ICD-10-CM

## 2023-12-08 PROCEDURE — 90471 IMMUNIZATION ADMIN: CPT

## 2023-12-08 PROCEDURE — 90682 RIV4 VACC RECOMBINANT DNA IM: CPT

## 2023-12-08 PROCEDURE — 90750 HZV VACC RECOMBINANT IM: CPT

## 2023-12-08 PROCEDURE — 99207 PR NO CHARGE NURSE ONLY: CPT

## 2023-12-08 PROCEDURE — 90472 IMMUNIZATION ADMIN EACH ADD: CPT

## 2023-12-08 NOTE — PROGRESS NOTES
Prior to immunization administration, verified patients identity using patient s name and date of birth. Please see Immunization Activity for additional information.     Screening Questionnaire for Adult Immunization    Are you sick today?   No   Do you have allergies to medications, food, a vaccine component or latex?   No   Have you ever had a serious reaction after receiving a vaccination?   No   Do you have a long-term health problem with heart, lung, kidney, or metabolic disease (e.g., diabetes), asthma, a blood disorder, no spleen, complement component deficiency, a cochlear implant, or a spinal fluid leak?  Are you on long-term aspirin therapy?   No   Do you have cancer, leukemia, HIV/AIDS, or any other immune system problem?   No   Do you have a parent, brother, or sister with an immune system problem?   No   In the past 3 months, have you taken medications that affect  your immune system, such as prednisone, other steroids, or anticancer drugs; drugs for the treatment of rheumatoid arthritis, Crohn s disease, or psoriasis; or have you had radiation treatments?   No   Have you had a seizure, or a brain or other nervous system problem?   No   During the past year, have you received a transfusion of blood or blood    products, or been given immune (gamma) globulin or antiviral drug?   No   For women: Are you pregnant or is there a chance you could become       pregnant during the next month?   No   Have you received any vaccinations in the past 4 weeks?   No     Immunization questionnaire answers were all negative.    I have reviewed the following standing orders:   This patient is due and qualifies for the Influenza vaccine.    Click here for Influenza Vaccine Standing Order    I have reviewed the vaccines inclusion and exclusion criteria; No concerns regarding eligibility.         This patient is due and qualifies for the Zoster vaccine.    Click here for Zoster Standing Order    I have reviewed the vaccines  inclusion and exclusion criteria; No concerns regarding eligibility.     Patient instructed to remain in clinic for 15 minutes afterwards, and to report any adverse reactions.     Screening performed by Digna Johansen CMA on 12/8/2023 at 3:11 PM.

## 2023-12-26 DIAGNOSIS — M10.9 ACUTE GOUTY ARTHRITIS: ICD-10-CM

## 2023-12-27 NOTE — TELEPHONE ENCOUNTER
Requested Prescriptions   Pending Prescriptions Disp Refills    allopurinol (ZYLOPRIM) 100 MG tablet [Pharmacy Med Name: allopurinol 100 mg tablet] 90 tablet 0     Sig: Take 1 tablet (100 mg) by mouth daily       Gout Agents Protocol Failed - 12/26/2023  3:35 PM        Failed - CBC on file in past 12 months     Recent Labs   Lab Test 04/10/17  1435   WBC 9.3   RBC 5.41   HGB 15.5   HCT 43.9                    Failed - ALT on file in past 12 months     Recent Labs   Lab Test 10/01/18  1402   ALT 37             Failed - Has Uric Acid on file in past 12 months and value is less than 6     Recent Labs   Lab Test 09/20/22  1101   URIC 8.5*     If level is 6mg/dL or greater, ok to refill one time and refer to provider.           Failed - Has GFR on file in past 12 months and most recent value is normal        Passed - Medication is active on med list        Passed - Placeholder        Passed - Recent (12 mo) or future (90 days) visit within the authorizing provider's specialty     The patient must have completed an in-person or virtual visit within the past 12 months or has a future visit scheduled within the next 90 days with the authorizing provider s specialty.  Urgent care and e-visits do not quality as an office visit for this protocol.          Passed - Patient is age 18 or older     Refill protocol is for patient's aged 18 years and older          Passed - Normal serum creatinine on file in the past 12 months     Recent Labs   Lab Test 11/27/23  1558   CR 0.99       Ok to refill medication if creatinine is low

## 2023-12-28 RX ORDER — ALLOPURINOL 100 MG/1
100 TABLET ORAL DAILY
Qty: 90 TABLET | Refills: 0 | Status: SHIPPED | OUTPATIENT
Start: 2023-12-28 | End: 2024-03-25

## 2024-01-01 DIAGNOSIS — N52.9 ERECTILE DYSFUNCTION, UNSPECIFIED ERECTILE DYSFUNCTION TYPE: ICD-10-CM

## 2024-01-02 RX ORDER — SILDENAFIL 100 MG/1
100 TABLET, FILM COATED ORAL DAILY PRN
Qty: 6 TABLET | Refills: 1 | Status: SHIPPED | OUTPATIENT
Start: 2024-01-02 | End: 2024-09-27

## 2024-03-18 DIAGNOSIS — I10 BENIGN ESSENTIAL HYPERTENSION: ICD-10-CM

## 2024-03-18 RX ORDER — CHLORTHALIDONE 25 MG/1
25 TABLET ORAL DAILY
Qty: 90 TABLET | Refills: 1 | Status: SHIPPED | OUTPATIENT
Start: 2024-03-18 | End: 2024-09-10

## 2024-03-18 NOTE — TELEPHONE ENCOUNTER
Prescription approved per [facility] Refill Protocol.  Pending Prescriptions:                       Disp   Refills    chlorthalidone (HYGROTON) 25 MG tablet [P*30 tab*3            Sig: Take 1 tablet (25 mg) by mouth daily      Requested Prescriptions   Pending Prescriptions Disp Refills    chlorthalidone (HYGROTON) 25 MG tablet [Pharmacy Med Name: chlorthalidone 25 mg tablet] 30 tablet 3     Sig: Take 1 tablet (25 mg) by mouth daily       Diuretics (Including Combos) Protocol Passed - 3/18/2024  8:03 AM        Passed - Blood pressure under 140/90 in past 12 months     BP Readings from Last 3 Encounters:   11/27/23 138/70   11/07/23 137/86   10/31/23 (!) 142/91                 Passed - Medication is active on med list        Passed - Medication indicated for associated diagnosis     Medication is associated with one or more of the following diagnoses:     Edema   Hypertension   Heart Failure   Meniere's Disease          Passed - Has GFR on file in past 12 months and most recent value is normal        Passed - Recent (12 mo) or future (90 days) visit within the authorizing provider's specialty     The patient must have completed an in-person or virtual visit within the past 12 months or has a future visit scheduled within the next 90 days with the authorizing provider s specialty.  Urgent care and e-visits do not quality as an office visit for this protocol.          Passed - Patient is age 18 or older           Wilma Plaza RN on 3/18/2024 at 2:40 PM

## 2024-03-25 DIAGNOSIS — J31.0 CHRONIC RHINITIS: ICD-10-CM

## 2024-03-25 DIAGNOSIS — M10.9 ACUTE GOUTY ARTHRITIS: ICD-10-CM

## 2024-03-25 RX ORDER — CETIRIZINE HYDROCHLORIDE 10 MG/1
10 TABLET ORAL EVERY EVENING
Qty: 30 TABLET | Refills: 0 | Status: SHIPPED | OUTPATIENT
Start: 2024-03-25 | End: 2024-05-07

## 2024-03-25 RX ORDER — ALLOPURINOL 100 MG/1
100 TABLET ORAL DAILY
Qty: 90 TABLET | Refills: 0 | Status: SHIPPED | OUTPATIENT
Start: 2024-03-25 | End: 2024-06-19

## 2024-03-25 NOTE — TELEPHONE ENCOUNTER
Requested Prescriptions   Pending Prescriptions Disp Refills    ALLERGY RELIEF CETIRIZINE 10 MG tablet [Pharmacy Med Name: Allergy Relief (cetirizine) 10 mg tablet] 30 tablet 0     Sig: Take 1 tablet (10 mg) by mouth every evening       Antihistamines Protocol Passed - 3/25/2024 11:42 AM        Passed - Patient is 3-64 years of age     Apply weight-based dosing for peds patients age 3 - 12 years of age.    Forward request to provider for patients under the age of 3 or over the age of 64.          Passed - Recent (12 mo) or future (30 days) visit within the authorizing provider's specialty     The patient must have completed an in-person or virtual visit within the past 12 months or has a future visit scheduled within the next 90 days with the authorizing provider s specialty.  Urgent care and e-visits do not quality as an office visit for this protocol.          Passed - Medication is active on med list          allopurinol (ZYLOPRIM) 100 MG tablet [Pharmacy Med Name: allopurinol 100 mg tablet] 90 tablet 0     Sig: Take 1 tablet (100 mg) by mouth daily       Gout Agents Protocol Failed - 3/25/2024 11:42 AM        Failed - CBC on file in past 12 months     Recent Labs   Lab Test 04/10/17  1435   WBC 9.3   RBC 5.41   HGB 15.5   HCT 43.9                    Failed - ALT on file in past 12 months     Recent Labs   Lab Test 10/01/18  1402   ALT 37             Failed - Has Uric Acid on file in past 12 months and value is less than 6     Recent Labs   Lab Test 09/20/22  1101   URIC 8.5*     If level is 6mg/dL or greater, ok to refill one time and refer to provider.           Passed - Medication is active on med list        Passed - Medication indicated for associated diagnosis     One of the following medications: colchicine is prescribed for one or more of the following conditions:     Amyloidosis   ulcer of mouth   Bechet's syndrome   Pericarditis   Peyronie's disease, psoriasis          Passed - Has GFR on file in  past 12 months and most recent value is normal        Passed - Recent (12 mo) or future (90 days) visit within the authorizing provider's specialty     The patient must have completed an in-person or virtual visit within the past 12 months or has a future visit scheduled within the next 90 days with the authorizing provider s specialty.  Urgent care and e-visits do not quality as an office visit for this protocol.          Passed - Patient is age 18 or older     Refill protocol is for patient's aged 18 years and older          Passed - Normal serum creatinine on file in the past 12 months     Recent Labs   Lab Test 11/27/23  1558   CR 0.99

## 2024-05-07 ENCOUNTER — MYC REFILL (OUTPATIENT)
Dept: FAMILY MEDICINE | Facility: CLINIC | Age: 54
End: 2024-05-07
Payer: COMMERCIAL

## 2024-05-07 DIAGNOSIS — J31.0 CHRONIC RHINITIS: ICD-10-CM

## 2024-05-08 RX ORDER — CETIRIZINE HYDROCHLORIDE 10 MG/1
10 TABLET ORAL EVERY EVENING
Qty: 30 TABLET | Refills: 5 | Status: SHIPPED | OUTPATIENT
Start: 2024-05-08 | End: 2024-09-10

## 2024-06-18 DIAGNOSIS — M10.9 ACUTE GOUTY ARTHRITIS: ICD-10-CM

## 2024-06-19 RX ORDER — ALLOPURINOL 100 MG/1
100 TABLET ORAL DAILY
Qty: 90 TABLET | Refills: 0 | Status: SHIPPED | OUTPATIENT
Start: 2024-06-19 | End: 2024-09-17

## 2024-06-19 NOTE — TELEPHONE ENCOUNTER
Requested Prescriptions   Pending Prescriptions Disp Refills    allopurinol (ZYLOPRIM) 100 MG tablet [Pharmacy Med Name: allopurinol 100 mg tablet] 90 tablet 0     Sig: Take 1 tablet (100 mg) by mouth daily       Gout Agents Protocol Failed - 6/18/2024  2:35 PM        Failed - CBC on file in past 12 months     Recent Labs   Lab Test 04/10/17  1435   WBC 9.3   RBC 5.41   HGB 15.5   HCT 43.9                    Failed - ALT on file in past 12 months     Recent Labs   Lab Test 10/01/18  1402   ALT 37             Failed - Has Uric Acid on file in past 12 months and value is less than 6     Recent Labs   Lab Test 09/20/22  1101   URIC 8.5*     If level is 6mg/dL or greater, ok to refill one time and refer to provider.           Passed - Medication is active on med list        Passed - Medication indicated for associated diagnosis     One of the following medications: colchicine is prescribed for one or more of the following conditions:     Amyloidosis   ulcer of mouth   Bechet's syndrome   Pericarditis   Peyronie's disease, psoriasis          Passed - Has GFR on file in past 12 months and most recent value is normal        Passed - Recent (12 mo) or future (90 days) visit within the authorizing provider's specialty     The patient must have completed an in-person or virtual visit within the past 12 months or has a future visit scheduled within the next 90 days with the authorizing provider s specialty.  Urgent care and e-visits do not quality as an office visit for this protocol.          Passed - Patient is age 18 or older     Refill protocol is for patient's aged 18 years and older

## 2024-08-20 ENCOUNTER — TELEPHONE (OUTPATIENT)
Dept: FAMILY MEDICINE | Facility: CLINIC | Age: 54
End: 2024-08-20
Payer: COMMERCIAL

## 2024-08-20 NOTE — LETTER
August 20, 2024    To  Arsenio Stockton  [address] [place] [address]    Your team at [facility] cares about your health. We have reviewed your chart and based on our findings; we are making the following recommendations to better manage your health.     You are in particular need of attention regarding the following:     Call or MyChart message your clinic to schedule a colonoscopy, schedule/ a FIT Test, or order a Cologuard test. If you are unsure what type of test you need, please call your clinic and speak to clinic staff.   Colon cancer is now the second leading cause of cancer-related deaths in the [place] [facility] for both men and women and there are over 130,000 new cases and 50,000 deaths per year from colon cancer. Colonoscopies can prevent 90-95% of these deaths. Problem lesions can be removed before they ever become cancer. This test is not only looking for cancer, but also getting rid of precancerous lesions.   If you are under/uninsured, we recommend you contact the [employer] Program.[employer] is a free colorectal cancer screening program that provides colonoscopies for eligible under/uninsured [place] men and women. If you are interested in receiving a free colonoscopy, please call [employer] at t [phone] (mention code [email or web address]) to see if you're eligible. Please have them send us the results.     If you have already completed these items, please contact the clinic via phone or   [employer] so your care team can review and update your records. Thank you for   choosing [facility] for your healthcare needs. For any questions,   concerns, or to schedule an appointment please contact our clinic.    Healthy Regards,      Your [facility] Care Team

## 2024-08-20 NOTE — TELEPHONE ENCOUNTER
Patient Quality Outreach    Patient is due for the following:   Colon Cancer Screening    Next Steps:   Colonoscopy     Type of outreach:    Sent [employer] message.      Questions for provider review:    None           Kiki Hurtado CMA

## 2024-09-10 DIAGNOSIS — I10 BENIGN ESSENTIAL HYPERTENSION: ICD-10-CM

## 2024-09-10 DIAGNOSIS — J31.0 CHRONIC RHINITIS: ICD-10-CM

## 2024-09-10 RX ORDER — CETIRIZINE HCL 10 MG/1
10 TABLET ORAL EVERY EVENING
Qty: 30 TABLET | Refills: 5 | Status: SHIPPED | OUTPATIENT
Start: 2024-09-10

## 2024-09-10 RX ORDER — CHLORTHALIDONE 25 MG/1
25 TABLET ORAL DAILY
Qty: 90 TABLET | Refills: 1 | Status: SHIPPED | OUTPATIENT
Start: 2024-09-10

## 2024-09-17 DIAGNOSIS — M10.9 ACUTE GOUTY ARTHRITIS: ICD-10-CM

## 2024-09-17 RX ORDER — ALLOPURINOL 100 MG/1
100 TABLET ORAL DAILY
Qty: 90 TABLET | Refills: 0 | Status: SHIPPED | OUTPATIENT
Start: 2024-09-17

## 2024-09-17 NOTE — TELEPHONE ENCOUNTER
Requested Prescriptions   Pending Prescriptions Disp Refills    allopurinol (ZYLOPRIM) 100 MG tablet [Pharmacy Med Name: allopurinol 100 mg tablet] 90 tablet 0     Sig: Take 1 tablet (100 mg) by mouth daily       Gout Agents Protocol Failed - 9/17/2024 11:23 AM        Failed - CBC on file in past 12 months     Recent Labs   Lab Test 04/10/17  1435   WBC 9.3   RBC 5.41   HGB 15.5   HCT 43.9                    Failed - ALT on file in past 12 months     Recent Labs   Lab Test 10/01/18  1402   ALT 37             Failed - Has Uric Acid on file in past 12 months and value is less than 6     Recent Labs   Lab Test 09/20/22  1101   URIC 8.5*     If level is 6mg/dL or greater, ok to refill one time and refer to provider.           Passed - Medication is active on med list        Passed - Medication indicated for associated diagnosis     One of the following medications: colchicine is prescribed for one or more of the following conditions:     Amyloidosis   ulcer of mouth   Bechet's syndrome   Pericarditis   Peyronie's disease, psoriasis          Passed - Has GFR on file in past 12 months and most recent value is normal        Passed - Recent (12 mo) or future (90 days) visit within the authorizing provider's specialty     The patient must have completed an in-person or virtual visit within the past 12 months or has a future visit scheduled within the next 90 days with the authorizing provider s specialty.  Urgent care and e-visits do not quality as an office visit for this protocol.          Passed - Patient is age 18 or older     Refill protocol is for patient's aged 18 years and older

## 2024-09-27 DIAGNOSIS — N52.9 ERECTILE DYSFUNCTION, UNSPECIFIED ERECTILE DYSFUNCTION TYPE: ICD-10-CM

## 2024-09-27 RX ORDER — SILDENAFIL 100 MG/1
100 TABLET, FILM COATED ORAL DAILY PRN
Qty: 6 TABLET | Refills: 1 | Status: SHIPPED | OUTPATIENT
Start: 2024-09-27

## 2024-10-28 ENCOUNTER — PATIENT OUTREACH (OUTPATIENT)
Dept: CARE COORDINATION | Facility: CLINIC | Age: 54
End: 2024-10-28
Payer: COMMERCIAL

## 2024-11-11 ENCOUNTER — PATIENT OUTREACH (OUTPATIENT)
Dept: CARE COORDINATION | Facility: CLINIC | Age: 54
End: 2024-11-11
Payer: COMMERCIAL

## 2024-12-10 DIAGNOSIS — K21.9 GASTROESOPHAGEAL REFLUX DISEASE WITHOUT ESOPHAGITIS: ICD-10-CM

## 2024-12-10 DIAGNOSIS — M10.9 ACUTE GOUTY ARTHRITIS: ICD-10-CM

## 2024-12-10 RX ORDER — ALLOPURINOL 100 MG/1
100 TABLET ORAL DAILY
Qty: 30 TABLET | Refills: 0 | Status: SHIPPED | OUTPATIENT
Start: 2024-12-10

## 2024-12-10 RX ORDER — FAMOTIDINE 20 MG/1
20 TABLET, FILM COATED ORAL 2 TIMES DAILY
Qty: 60 TABLET | Refills: 0 | Status: SHIPPED | OUTPATIENT
Start: 2024-12-10

## 2025-01-05 ENCOUNTER — HEALTH MAINTENANCE LETTER (OUTPATIENT)
Age: 55
End: 2025-01-05

## 2025-01-21 DIAGNOSIS — M10.9 ACUTE GOUTY ARTHRITIS: ICD-10-CM

## 2025-01-21 DIAGNOSIS — K21.9 GASTROESOPHAGEAL REFLUX DISEASE WITHOUT ESOPHAGITIS: ICD-10-CM

## 2025-01-23 RX ORDER — FAMOTIDINE 20 MG/1
20 TABLET, FILM COATED ORAL 2 TIMES DAILY
Qty: 60 TABLET | Refills: 0 | OUTPATIENT
Start: 2025-01-23

## 2025-01-23 RX ORDER — ALLOPURINOL 100 MG/1
TABLET ORAL
Qty: 30 TABLET | Refills: 0 | OUTPATIENT
Start: 2025-01-23

## 2025-02-17 DIAGNOSIS — N52.9 ERECTILE DYSFUNCTION, UNSPECIFIED ERECTILE DYSFUNCTION TYPE: ICD-10-CM

## 2025-02-17 RX ORDER — SILDENAFIL 100 MG/1
100 TABLET, FILM COATED ORAL DAILY PRN
Qty: 6 TABLET | Refills: 1 | OUTPATIENT
Start: 2025-02-17

## 2025-03-12 DIAGNOSIS — J31.0 CHRONIC RHINITIS: ICD-10-CM

## 2025-03-12 RX ORDER — CETIRIZINE HYDROCHLORIDE 10 MG/1
10 TABLET ORAL EVERY EVENING
Qty: 90 TABLET | Refills: 1 | OUTPATIENT
Start: 2025-03-12

## 2025-03-12 NOTE — LETTER
[facility]  [address]  [facility] [id number] [phone]  March 12, 2025    Arsenio Stockton  [address] [place] [address]    Dear Arsenio,    We care about your health and have reviewed your health plan including your medical conditions, medication list, and lab results.  Based on this review, it is recommended that you follow up regarding the following health topic(s):     -Wellness (Physical) Visit & Medication Refills - In-person clinic appointment needed for ANY medication refills    Please call us at [phone] (or use [employer]) to address the above recommendations.     Thank you for trusting [facility] and we appreciate the opportunity to serve you.  We look forward to supporting your healthcare needs in the future.    Healthy Regards,      Your Health Care Team  [facility]

## 2025-03-12 NOTE — TELEPHONE ENCOUNTER
Pt has not been seen in clinic since 11/27/23.      LM for pt - Also mailed letter and sent My Chart message

## 2025-03-21 SDOH — HEALTH STABILITY: PHYSICAL HEALTH: ON AVERAGE, HOW MANY MINUTES DO YOU ENGAGE IN EXERCISE AT THIS LEVEL?: 40 MIN

## 2025-03-21 SDOH — HEALTH STABILITY: PHYSICAL HEALTH: ON AVERAGE, HOW MANY DAYS PER WEEK DO YOU ENGAGE IN MODERATE TO STRENUOUS EXERCISE (LIKE A BRISK WALK)?: 4 DAYS

## 2025-03-21 ASSESSMENT — SOCIAL DETERMINANTS OF HEALTH (SDOH): HOW OFTEN DO YOU GET TOGETHER WITH FRIENDS OR RELATIVES?: THREE TIMES A WEEK

## 2025-03-26 ENCOUNTER — ANCILLARY PROCEDURE (OUTPATIENT)
Dept: GENERAL RADIOLOGY | Facility: CLINIC | Age: 55
End: 2025-03-26
Attending: NURSE PRACTITIONER
Payer: COMMERCIAL

## 2025-03-26 ENCOUNTER — OFFICE VISIT (OUTPATIENT)
Dept: FAMILY MEDICINE | Facility: CLINIC | Age: 55
End: 2025-03-26
Payer: COMMERCIAL

## 2025-03-26 VITALS
HEART RATE: 66 BPM | DIASTOLIC BLOOD PRESSURE: 68 MMHG | OXYGEN SATURATION: 96 % | RESPIRATION RATE: 16 BRPM | BODY MASS INDEX: 44.56 KG/M2 | TEMPERATURE: 97.7 F | HEIGHT: 68 IN | SYSTOLIC BLOOD PRESSURE: 134 MMHG | WEIGHT: 294 LBS

## 2025-03-26 DIAGNOSIS — Z12.11 SCREEN FOR COLON CANCER: ICD-10-CM

## 2025-03-26 DIAGNOSIS — J31.0 CHRONIC RHINITIS: ICD-10-CM

## 2025-03-26 DIAGNOSIS — Z13.1 SCREENING FOR DIABETES MELLITUS: ICD-10-CM

## 2025-03-26 DIAGNOSIS — M25.562 LEFT KNEE PAIN, UNSPECIFIED CHRONICITY: ICD-10-CM

## 2025-03-26 DIAGNOSIS — Z00.00 ROUTINE GENERAL MEDICAL EXAMINATION AT A HEALTH CARE FACILITY: Primary | ICD-10-CM

## 2025-03-26 DIAGNOSIS — L28.0 LICHEN SIMPLEX CHRONICUS: ICD-10-CM

## 2025-03-26 DIAGNOSIS — Z12.5 SCREENING FOR PROSTATE CANCER: ICD-10-CM

## 2025-03-26 DIAGNOSIS — I10 BENIGN ESSENTIAL HYPERTENSION: ICD-10-CM

## 2025-03-26 DIAGNOSIS — M10.9 ACUTE GOUTY ARTHRITIS: ICD-10-CM

## 2025-03-26 DIAGNOSIS — E66.01 MORBID OBESITY (H): ICD-10-CM

## 2025-03-26 DIAGNOSIS — K21.9 GASTROESOPHAGEAL REFLUX DISEASE WITHOUT ESOPHAGITIS: ICD-10-CM

## 2025-03-26 LAB
ANION GAP SERPL CALCULATED.3IONS-SCNC: 15 MMOL/L (ref 7–15)
BUN SERPL-MCNC: 27.4 MG/DL (ref 6–20)
CALCIUM SERPL-MCNC: 9.9 MG/DL (ref 8.8–10.4)
CHLORIDE SERPL-SCNC: 101 MMOL/L (ref 98–107)
CREAT SERPL-MCNC: 1.05 MG/DL (ref 0.67–1.17)
EGFRCR SERPLBLD CKD-EPI 2021: 85 ML/MIN/1.73M2
EST. AVERAGE GLUCOSE BLD GHB EST-MCNC: 117 MG/DL
GLUCOSE SERPL-MCNC: 111 MG/DL (ref 70–99)
HBA1C MFR BLD: 5.7 % (ref 0–5.6)
HCO3 SERPL-SCNC: 24 MMOL/L (ref 22–29)
POTASSIUM SERPL-SCNC: 3.5 MMOL/L (ref 3.4–5.3)
PSA SERPL DL<=0.01 NG/ML-MCNC: 0.59 NG/ML (ref 0–3.5)
SODIUM SERPL-SCNC: 140 MMOL/L (ref 135–145)
URATE SERPL-MCNC: 7.6 MG/DL (ref 3.4–7)

## 2025-03-26 PROCEDURE — 83036 HEMOGLOBIN GLYCOSYLATED A1C: CPT | Performed by: NURSE PRACTITIONER

## 2025-03-26 PROCEDURE — 84550 ASSAY OF BLOOD/URIC ACID: CPT | Performed by: NURSE PRACTITIONER

## 2025-03-26 PROCEDURE — 36415 COLL VENOUS BLD VENIPUNCTURE: CPT | Performed by: NURSE PRACTITIONER

## 2025-03-26 PROCEDURE — 90471 IMMUNIZATION ADMIN: CPT | Performed by: NURSE PRACTITIONER

## 2025-03-26 PROCEDURE — 90472 IMMUNIZATION ADMIN EACH ADD: CPT | Performed by: NURSE PRACTITIONER

## 2025-03-26 PROCEDURE — G0103 PSA SCREENING: HCPCS | Performed by: NURSE PRACTITIONER

## 2025-03-26 PROCEDURE — 3075F SYST BP GE 130 - 139MM HG: CPT | Performed by: NURSE PRACTITIONER

## 2025-03-26 PROCEDURE — 80048 BASIC METABOLIC PNL TOTAL CA: CPT | Performed by: NURSE PRACTITIONER

## 2025-03-26 PROCEDURE — 90750 HZV VACC RECOMBINANT IM: CPT | Performed by: NURSE PRACTITIONER

## 2025-03-26 PROCEDURE — 1126F AMNT PAIN NOTED NONE PRSNT: CPT | Performed by: NURSE PRACTITIONER

## 2025-03-26 PROCEDURE — 73562 X-RAY EXAM OF KNEE 3: CPT | Mod: TC | Performed by: RADIOLOGY

## 2025-03-26 PROCEDURE — 99396 PREV VISIT EST AGE 40-64: CPT | Mod: 25 | Performed by: NURSE PRACTITIONER

## 2025-03-26 PROCEDURE — 90677 PCV20 VACCINE IM: CPT | Performed by: NURSE PRACTITIONER

## 2025-03-26 PROCEDURE — 3078F DIAST BP <80 MM HG: CPT | Performed by: NURSE PRACTITIONER

## 2025-03-26 PROCEDURE — 99214 OFFICE O/P EST MOD 30 MIN: CPT | Mod: 25 | Performed by: NURSE PRACTITIONER

## 2025-03-26 RX ORDER — TRIAMCINOLONE ACETONIDE 5 MG/G
OINTMENT TOPICAL
Qty: 30 G | Refills: 1 | Status: SHIPPED | OUTPATIENT
Start: 2025-03-26

## 2025-03-26 RX ORDER — FAMOTIDINE 20 MG/1
20 TABLET, FILM COATED ORAL 2 TIMES DAILY
Qty: 180 TABLET | Refills: 3 | Status: SHIPPED | OUTPATIENT
Start: 2025-03-26

## 2025-03-26 RX ORDER — ALLOPURINOL 100 MG/1
100 TABLET ORAL DAILY
Qty: 90 TABLET | Refills: 3 | Status: SHIPPED | OUTPATIENT
Start: 2025-03-26

## 2025-03-26 RX ORDER — CHLORTHALIDONE 25 MG/1
25 TABLET ORAL DAILY
Qty: 90 TABLET | Refills: 3 | Status: SHIPPED | OUTPATIENT
Start: 2025-03-26

## 2025-03-26 RX ORDER — CETIRIZINE HYDROCHLORIDE 10 MG/1
10 TABLET ORAL EVERY EVENING
Qty: 90 TABLET | Refills: 3 | Status: SHIPPED | OUTPATIENT
Start: 2025-03-26

## 2025-03-26 ASSESSMENT — PAIN SCALES - GENERAL: PAINLEVEL_OUTOF10: NO PAIN (0)

## 2025-03-26 NOTE — PATIENT INSTRUCTIONS
Will be notified of pending labs and x ray results.  Try PT for knee. If no improvement, may need to see orthopedics.  Try the steroid ointment for rash, if no improvement, I want you to see dermatology.        Patient Education   Preventive Care Advice   This is general advice given by our system to help you stay healthy. However, your care team may have specific advice just for you. Please talk to your care team about your preventive care needs.  Nutrition  Eat 5 or more servings of fruits and vegetables each day.  Try wheat bread, brown rice and whole grain pasta (instead of white bread, rice, and pasta).  Get enough calcium and vitamin D. Check the label on foods and aim for 100% of the RDA (recommended daily allowance).  Lifestyle  Exercise at least 150 minutes each week  (30 minutes a day, 5 days a week).  Do muscle strengthening activities 2 days a week. These help control your weight and prevent disease.  No smoking.  Wear sunscreen to prevent skin cancer.  Have a dental exam and cleaning every 6 months.  Yearly exams  See your health care team every year to talk about:  Any changes in your health.  Any medicines your care team has prescribed.  Preventive care, family planning, and ways to prevent chronic diseases.  Shots (vaccines)   HPV shots (up to age 26), if you've never had them before.  Hepatitis B shots (up to age 59), if you've never had them before.  COVID-19 shot: Get this shot when it's due.  Flu shot: Get a flu shot every year.  Tetanus shot: Get a tetanus shot every 10 years.  Pneumococcal, hepatitis A, and RSV shots: Ask your care team if you need these based on your risk.  Shingles shot (for age 50 and up)  General health tests  Diabetes screening:  Starting at age 35, Get screened for diabetes at least every 3 years.  If you are younger than age 35, ask your care team if you should be screened for diabetes.  Cholesterol test: At age 39, start having a cholesterol test every 5 years, or more  often if advised.  Bone density scan (DEXA): At age 50, ask your care team if you should have this scan for osteoporosis (brittle bones).  Hepatitis C: Get tested at least once in your life.  STIs (sexually transmitted infections)  Before age 24: Ask your care team if you should be screened for STIs.  After age 24: Get screened for STIs if you're at risk. You are at risk for STIs (including HIV) if:  You are sexually active with more than one person.  You don't use condoms every time.  You or a partner was diagnosed with a sexually transmitted infection.  If you are at risk for HIV, ask about PrEP medicine to prevent HIV.  Get tested for HIV at least once in your life, whether you are at risk for HIV or not.  Cancer screening tests  Cervical cancer screening: If you have a cervix, begin getting regular cervical cancer screening tests starting at age 21.  Breast cancer scan (mammogram): If you've ever had breasts, begin having regular mammograms starting at age 40. This is a scan to check for breast cancer.  Colon cancer screening: It is important to start screening for colon cancer at age 45.  Have a colonoscopy test every 10 years (or more often if you're at risk) Or, ask your provider about stool tests like a FIT test every year or Cologuard test every 3 years.  To learn more about your testing options, visit:   .  For help making a decision, visit:   https://bit.ly/ut83384.  Prostate cancer screening test: If you have a prostate, ask your care team if a prostate cancer screening test (PSA) at age 55 is right for you.  Lung cancer screening: If you are a current or former smoker ages 50 to 80, ask your care team if ongoing lung cancer screenings are right for you.  For informational purposes only. Not to replace the advice of your health care provider. Copyright   2023 [employer]. All rights reserved. Clinically reviewed by the [facility] Transitions Program. [employer] 119398 - REV  01/24.  Learning About Stress  What is stress?     Stress is your body's response to a hard situation. Your body can have a physical, emotional, or mental response. Stress is a fact of life for most people, and it affects everyone differently. What causes stress for you may not be stressful for someone else.  A lot of things can cause stress. You may feel stress when you go on a job interview, take a test, or run a race. This kind of short-term stress is normal and even useful. It can help you if you need to work hard or react quickly. For example, stress can help you finish an important job on time.  Long-term stress is caused by ongoing stressful situations or events. Examples of long-term stress include long-term health problems, ongoing problems at work, or conflicts in your family. Long-term stress can harm your health.  How does stress affect your health?  When you are stressed, your body responds as though you are in danger. It makes hormones that speed up your heart, make you breathe faster, and give you a burst of energy. This is called the fight-or-flight stress response. If the stress is over quickly, your body goes back to normal and no harm is done.  But if stress happens too often or lasts too long, it can have bad effects. Long-term stress can make you more likely to get sick, and it can make symptoms of some diseases worse. If you tense up when you are stressed, you may develop neck, shoulder, or low back pain. Stress is linked to high blood pressure and heart disease.  Stress also harms your emotional health. It can make you figueroa, tense, or depressed. Your relationships may suffer, and you may not do well at work or school.  What can you do to manage stress?  You can try these things to help manage stress:   Do something active. Exercise or activity can help reduce stress. Walking is a great way to get started. Even everyday activities such as housecleaning or yard work can help.  Try yoga or desirae  chi. These techniques combine exercise and meditation. You may need some training at first to learn them.  Do something you enjoy. For example, listen to music or go to a movie. Practice your hobby or do volunteer work.  Meditate. This can help you relax, because you are not worrying about what happened before or what may happen in the future.  Do guided imagery. Imagine yourself in any setting that helps you feel calm. You can use online videos, books, or a teacher to guide you.  Do breathing exercises. For example:  From a standing position, bend forward from the waist with your knees slightly bent. Let your arms dangle close to the floor.  Breathe in slowly and deeply as you return to a standing position. Roll up slowly and lift your head last.  Hold your breath for just a few seconds in the standing position.  Breathe out slowly and bend forward from the waist.  Let your feelings out. Talk, laugh, cry, and express anger when you need to. Talking with supportive friends or family, a counselor, or a sohan leader about your feelings is a healthy way to relieve stress. Avoid discussing your feelings with people who make you feel worse.  Write. It may help to write about things that are bothering you. This helps you find out how much stress you feel and what is causing it. When you know this, you can find better ways to cope.  What can you do to prevent stress?  You might try some of these things to help prevent stress:  Manage your time. This helps you find time to do the things you want and need to do.  Get enough sleep. Your body recovers from the stresses of the day while you are sleeping.  Get support. Your family, friends, and community can make a difference in how you experience stress.  Limit your news feed. Avoid or limit time on social media or news that may make you feel stressed.  Do something active. Exercise or activity can help reduce stress. Walking is a great way to get started.  Where can you learn  "more?  Go to https://www."[employer].net/patiented  Enter N032 in the search box to learn more about \"Learning About Stress.\"  Current as of: October 24, 2024  Content Version: 14.4 2024-2025 [employer].   Care instructions adapted under license by your healthcare professional. If you have questions about a medical condition or this instruction, always ask your healthcare professional. [employer] disclaims any warranty or liability for your use of this information.       "

## 2025-03-26 NOTE — PROGRESS NOTES
"Preventive Care Visit  [facility]  RODRICK Bustillos CNP, Family Medicine  Mar 26, 2025      Assessment & Plan     Routine general medical examination at a health care facility    Shingles and pneumonia shots given.    Gastroesophageal reflux disease without esophagitis    - famotidine (PEPCID) 20 MG tablet; Take 1 tablet (20 mg) by mouth 2 times daily.  No concerns, continue medications and monitoring.    Benign essential hypertension    - BASIC METABOLIC PANEL; Future  - chlorthalidone (HYGROTON) 25 MG tablet; Take 1 tablet (25 mg) by mouth daily.  Controlled, continue medications and monitoring, labs pending.  Reviewed   Acute gouty arthritis    - Uric acid; Future  - allopurinol (ZYLOPRIM) 100 MG tablet; Take 1 tablet (100 mg) by mouth daily.  He denies any concerns with flare ups. Avoids purine rich foods.    Chronic rhinitis    - cetirizine (ALLERGY RELIEF/INDOOR/OUTDOOR) 10 MG tablet; Take 1 tablet (10 mg) by mouth every evening.  He starts itching a lot if he doesn't have this. Continue with daily use.    Screen for colon cancer    - Colonoscopy Screening  Referral; Future    Morbid obesity (H)    He may re do keto diet again as that was successful in the past for weight loss.      Left knee pain, unspecified chronicity    - XR Knee Left 3 Views; Future  - Physical Therapy  Referral; Future  Repeat x ray and compare it to 2022. Start PT.    Screening for diabetes mellitus    - Hemoglobin A1c; Future    Screening for prostate cancer    - PSA, screen; Future    Lichen simplex chronicus    - triamcinolone (KENALOG) 0.5 % external ointment; Apply twice daily to rash on legs at needed.  Resume daily use of this. If no improvement, may need to re visit with dermatology.            BMI  Estimated body mass index is 44.7 kg/m  as calculated from the following:    Height as of this encounter: 1.727 m (5' 8\").    Weight as of this encounter: 133.4 kg (294 lb). "   Weight management plan: Discussed healthy diet and exercise guidelines    Counseling  Appropriate preventive services were addressed with this patient via screening, questionnaire, or discussion as appropriate for fall prevention, nutrition, physical activity, Tobacco-use cessation, social engagement, weight loss and cognition.  Checklist reviewing preventive services available has been given to the patient.  Reviewed patient's diet, addressing concerns and/or questions.   The patient was instructed to see the dentist every 6 months.   He is at risk for psychosocial distress and has been provided with information to reduce risk.       See Patient Instructions  Patient Instructions   Will be notified of pending labs and x ray results.  Try PT for knee. If no improvement, may need to see orthopedics.  Try the steroid ointment for rash, if no improvement, I want you to see dermatology.        Patient Education  Preventive Care Advice   This is general advice given by our system to help you stay healthy. However, your care team may have specific advice just for you. Please talk to your care team about your preventive care needs.  Nutrition  Eat 5 or more servings of fruits and vegetables each day.  Try wheat bread, brown rice and whole grain pasta (instead of white bread, rice, and pasta).  Get enough calcium and vitamin D. Check the label on foods and aim for 100% of the RDA (recommended daily allowance).  Lifestyle  Exercise at least 150 minutes each week  (30 minutes a day, 5 days a week).  Do muscle strengthening activities 2 days a week. These help control your weight and prevent disease.  No smoking.  Wear sunscreen to prevent skin cancer.  Have a dental exam and cleaning every 6 months.  Yearly exams  See your health care team every year to talk about:  Any changes in your health.  Any medicines your care team has prescribed.  Preventive care, family planning, and ways to prevent chronic diseases.  Shots  (vaccines)   HPV shots (up to age 26), if you've never had them before.  Hepatitis B shots (up to age 59), if you've never had them before.  COVID-19 shot: Get this shot when it's due.  Flu shot: Get a flu shot every year.  Tetanus shot: Get a tetanus shot every 10 years.  Pneumococcal, hepatitis A, and RSV shots: Ask your care team if you need these based on your risk.  Shingles shot (for age 50 and up)  General health tests  Diabetes screening:  Starting at age 35, Get screened for diabetes at least every 3 years.  If you are younger than age 35, ask your care team if you should be screened for diabetes.  Cholesterol test: At age 39, start having a cholesterol test every 5 years, or more often if advised.  Bone density scan (DEXA): At age 50, ask your care team if you should have this scan for osteoporosis (brittle bones).  Hepatitis C: Get tested at least once in your life.  STIs (sexually transmitted infections)  Before age 24: Ask your care team if you should be screened for STIs.  After age 24: Get screened for STIs if you're at risk. You are at risk for STIs (including HIV) if:  You are sexually active with more than one person.  You don't use condoms every time.  You or a partner was diagnosed with a sexually transmitted infection.  If you are at risk for HIV, ask about PrEP medicine to prevent HIV.  Get tested for HIV at least once in your life, whether you are at risk for HIV or not.  Cancer screening tests  Cervical cancer screening: If you have a cervix, begin getting regular cervical cancer screening tests starting at age 21.  Breast cancer scan (mammogram): If you've ever had breasts, begin having regular mammograms starting at age 40. This is a scan to check for breast cancer.  Colon cancer screening: It is important to start screening for colon cancer at age 45.  Have a colonoscopy test every 10 years (or more often if you're at risk) Or, ask your provider about stool tests like a FIT test every year  or Cologuard test every 3 years.  To learn more about your testing options, visit:   .  For help making a decision, visit:   https://bit.ly/jy30784.  Prostate cancer screening test: If you have a prostate, ask your care team if a prostate cancer screening test (PSA) at age 55 is right for you.  Lung cancer screening: If you are a current or former smoker ages 50 to 80, ask your care team if ongoing lung cancer screenings are right for you.  For informational purposes only. Not to replace the advice of your health care provider. Copyright   2023 [employer]. All rights reserved. Clinically reviewed by the [facility] Transitions Program. [employer] 220400 - REV 01/24.  Learning About Stress  What is stress?     Stress is your body's response to a hard situation. Your body can have a physical, emotional, or mental response. Stress is a fact of life for most people, and it affects everyone differently. What causes stress for you may not be stressful for someone else.  A lot of things can cause stress. You may feel stress when you go on a job interview, take a test, or run a race. This kind of short-term stress is normal and even useful. It can help you if you need to work hard or react quickly. For example, stress can help you finish an important job on time.  Long-term stress is caused by ongoing stressful situations or events. Examples of long-term stress include long-term health problems, ongoing problems at work, or conflicts in your family. Long-term stress can harm your health.  How does stress affect your health?  When you are stressed, your body responds as though you are in danger. It makes hormones that speed up your heart, make you breathe faster, and give you a burst of energy. This is called the fight-or-flight stress response. If the stress is over quickly, your body goes back to normal and no harm is done.  But if stress happens too often or lasts too long, it can have bad effects.  Long-term stress can make you more likely to get sick, and it can make symptoms of some diseases worse. If you tense up when you are stressed, you may develop neck, shoulder, or low back pain. Stress is linked to high blood pressure and heart disease.  Stress also harms your emotional health. It can make you figueroa, tense, or depressed. Your relationships may suffer, and you may not do well at work or school.  What can you do to manage stress?  You can try these things to help manage stress:   Do something active. Exercise or activity can help reduce stress. Walking is a great way to get started. Even everyday activities such as housecleaning or yard work can help.  Try yoga or desirae chi. These techniques combine exercise and meditation. You may need some training at first to learn them.  Do something you enjoy. For example, listen to music or go to a movie. Practice your hobby or do volunteer work.  Meditate. This can help you relax, because you are not worrying about what happened before or what may happen in the future.  Do guided imagery. Imagine yourself in any setting that helps you feel calm. You can use online videos, books, or a teacher to guide you.  Do breathing exercises. For example:  From a standing position, bend forward from the waist with your knees slightly bent. Let your arms dangle close to the floor.  Breathe in slowly and deeply as you return to a standing position. Roll up slowly and lift your head last.  Hold your breath for just a few seconds in the standing position.  Breathe out slowly and bend forward from the waist.  Let your feelings out. Talk, laugh, cry, and express anger when you need to. Talking with supportive friends or family, a counselor, or a sohan leader about your feelings is a healthy way to relieve stress. Avoid discussing your feelings with people who make you feel worse.  Write. It may help to write about things that are bothering you. This helps you find out how much stress  "you feel and what is causing it. When you know this, you can find better ways to cope.  What can you do to prevent stress?  You might try some of these things to help prevent stress:  Manage your time. This helps you find time to do the things you want and need to do.  Get enough sleep. Your body recovers from the stresses of the day while you are sleeping.  Get support. Your family, friends, and community can make a difference in how you experience stress.  Limit your news feed. Avoid or limit time on social media or news that may make you feel stressed.  Do something active. Exercise or activity can help reduce stress. Walking is a great way to get started.  Where can you learn more?  Go to https://www.noodls.net/patiented  Enter N032 in the search box to learn more about \"Learning About Stress.\"  Current as of: October 24, 2024  Content Version: 14.4    1476-9283 [employer].   Care instructions adapted under license by your healthcare professional. If you have questions about a medical condition or this instruction, always ask your healthcare professional. [employer] disclaims any warranty or liability for your use of this information.         Judy Cesar is a 54 year old, presenting for the following:  Physical, Recheck Medication, and Musculoskeletal Problem (Left knee tightness x 2 years )        3/26/2025     8:45 AM   Additional Questions   Roomed by Pushpa        He has had left knee \"tightness\" on and off for about 2 years. States it doesn't really hurt but it feels tight. He cannot bend it all the way back. NKI.  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   He admits to not being as active over the winter and unfortunately gained back a lot of the weight he had lost.  Rash on back of calves. He has seen dermatology for that and had used steroid ointment. He isn't using anything for that right now.      Musculoskeletal Problem           Hypertension Follow-up    Do you " check your blood pressure regularly outside of the clinic? No   Are you following a low salt diet? Yes  Are your blood pressures ever more than 140 on the top number (systolic) OR more   than 90 on the bottom number (diastolic), for example 140/90? No    Advance Care Planning  Patient does not have a Health Care Directive: Discussed advance care planning with patient; information given to patient to review.      3/21/2025   General Health   How would you rate your overall physical health? (!) FAIR   Feel stress (tense, anxious, or unable to sleep) To some extent   (!) STRESS CONCERN      3/21/2025   Nutrition   Three or more servings of calcium each day? Yes   Diet: Other   If other, please elaborate: Keto   How many servings of fruit and vegetables per day? (!) 0-1   How many sweetened beverages each day? 0-1         3/21/2025   Exercise   Days per week of moderate/strenous exercise 4 days   Average minutes spent exercising at this level 40 min         3/21/2025   Social Factors   Frequency of gathering with friends or relatives Three times a week   Worry food won't last until get money to buy more No   Food not last or not have enough money for food? No   Do you have housing? (Housing is defined as stable permanent housing and does not include staying ouside in a car, in a tent, in an abandoned building, in an overnight shelter, or couch-surfing.) Yes   Are you worried about losing your housing? No   Lack of transportation? No   Unable to get utilities (heat,electricity)? Yes   Want help with housing or utility concern? No   (!) FINANCIAL RESOURCE STRAIN CONCERN      3/21/2025   Fall Risk   Fallen 2 or more times in the past year? No   Trouble with walking or balance? No          3/21/2025   Dental   Dentist two times every year? (!) NO           Today's PHQ-2 Score:       3/26/2025     8:29 AM   PHQ-2 ( 1999 [employer])   Q1: Little interest or pleasure in doing things 0   Q2: Feeling down, depressed or hopeless 1    PHQ-2 Score 1    Q1: Little interest or pleasure in doing things Not at all   Q2: Feeling down, depressed or hopeless Several days   PHQ-2 Score 1       Patient-reported           3/21/2025   Substance Use   Alcohol more than 3/day or more than 7/wk No   Do you use any other substances recreationally? No     Social History     Tobacco Use    Smoking status: Former     Types: Dip, chew, snus or snuff    Smokeless tobacco: Current     Types: Chew   Vaping Use    Vaping status: Never Used   Substance Use Topics    Alcohol use: Not Currently    Drug use: Not Currently     Types: Marijuana           3/21/2025   STI Screening   New sexual partner(s) since last STI/HIV test? No   ASCVD Risk   The 10-year ASCVD risk score (Kwame CLAIRE, et al., 2019) is: 5.4%    Values used to calculate the score:      Age: 54 years      Sex: Male      Is Non- : No      Diabetic: No      Tobacco smoker: No      Systolic Blood Pressure: 134 mmHg      Is BP treated: Yes      HDL Cholesterol: 45 mg/dL      Total Cholesterol: 187 mg/dL           Reviewed and updated as needed this visit by Provider                    BP Readings from Last 3 Encounters:   03/26/25 134/68   11/27/23 138/70   11/07/23 137/86    Wt Readings from Last 3 Encounters:   03/26/25 133.4 kg (294 lb)   11/27/23 135.2 kg (298 lb)   11/07/23 134 kg (295 lb 6.4 oz)                  Patient Active Problem List   Diagnosis    CARDIOVASCULAR SCREENING; LDL GOAL LESS THAN 160    Elevated blood pressure reading without diagnosis of hypertension    Benign essential hypertension    Sleep apnea    Morbid obesity (H)    GERD (gastroesophageal reflux disease)     No past surgical history on file.    Social History     Tobacco Use    Smoking status: Former     Types: Dip, chew, snus or snuff    Smokeless tobacco: Current     Types: Chew   Substance Use Topics    Alcohol use: Not Currently     Family History   Problem Relation Age of Onset     "Unknown/Adopted Maternal Grandmother     Unknown/Adopted Maternal Grandfather          Current Outpatient Medications   Medication Sig Dispense Refill    allopurinol (ZYLOPRIM) 100 MG tablet Take 1 tablet (100 mg) by mouth daily. 90 tablet 3    cetirizine (ALLERGY RELIEF/INDOOR/OUTDOOR) 10 MG tablet Take 1 tablet (10 mg) by mouth every evening. 90 tablet 3    chlorthalidone (HYGROTON) 25 MG tablet Take 1 tablet (25 mg) by mouth daily. 90 tablet 3    famotidine (PEPCID) 20 MG tablet Take 1 tablet (20 mg) by mouth 2 times daily. 180 tablet 3    sildenafil (VIAGRA) 100 MG tablet Take 1 tablet (100 mg) by mouth daily as needed (ED) 30 min to 4 hrs before sex. Do not use with nitroglycerin, terazosin or doxazosin. 6 tablet 1    triamcinolone (KENALOG) 0.5 % external ointment Apply twice daily to rash on legs at needed. 30 g 1     No Known Allergies  Recent Labs   Lab Test 11/27/23  1558 09/20/22  1101 07/27/21  1621 07/27/21  1621 12/30/19  1000 10/01/18  1402 10/01/18  1401 04/10/17  1435   A1C 5.7*  --   --   --   --  5.5  --   --    * 103*  --  97  --   --   --   --    HDL 45 47  --  54  --   --   --   --    TRIG 198* 109  --  144  --   --   --   --    ALT  --   --   --   --   --  37  --  46   CR 0.99 0.98   < > 0.91 1.01 0.91  --  0.95   GFRESTIMATED >90 >90   < > >90 88 90  --  86   GFRESTBLACK  --   --   --   --  >90 >90  --  >90  African American GFR Calc     POTASSIUM 3.7 3.6  --  3.7 3.7 3.8  --  3.1*   TSH  --   --   --   --   --   --  0.68 0.39*    < > = values in this interval not displayed.          Review of Systems  Constitutional, HEENT, cardiovascular, pulmonary, gi and gu systems are negative, except as otherwise noted.     Objective    Exam  /68   Pulse 66   Temp 97.7  F (36.5  C) (Tympanic)   Resp 16   Ht 1.727 m (5' 8\")   Wt 133.4 kg (294 lb)   SpO2 96%   BMI 44.70 kg/m     Estimated body mass index is 44.7 kg/m  as calculated from the following:    Height as of this encounter: " "1.727 m (5' 8\").    Weight as of this encounter: 133.4 kg (294 lb).    Physical Exam  GENERAL: alert and no distress  EYES: Eyes grossly normal to inspection, PERRL and conjunctivae and sclerae normal  HENT: ear canals and TM's normal, nose and mouth without ulcers or lesions  NECK: no adenopathy, no asymmetry, masses, or scars  RESP: lungs clear to auscultation - no rales, rhonchi or wheezes  CV: regular rate and rhythm, normal S1 S2, no S3 or S4, no murmur, click or rub, no peripheral edema  ABDOMEN: soft, obese, non tender, no hepatosplenomegaly, no masses and bowel sounds normal  MS: no gross musculoskeletal defects noted, no edema  SKIN: plaque rash on posterior calves, worse on the right  NEURO: Normal strength and tone, mentation intact and speech normal  PSYCH: mentation appears normal, affect normal/bright        Signed Electronically by: RODRICK Bustillos CNP    "

## 2025-05-10 DIAGNOSIS — L28.0 LICHEN SIMPLEX CHRONICUS: ICD-10-CM

## 2025-05-12 RX ORDER — TRIAMCINOLONE ACETONIDE 5 MG/G
OINTMENT TOPICAL
Qty: 30 G | Refills: 1 | Status: SHIPPED | OUTPATIENT
Start: 2025-05-12

## 2025-06-12 ENCOUNTER — MYC REFILL (OUTPATIENT)
Dept: FAMILY MEDICINE | Facility: CLINIC | Age: 55
End: 2025-06-12
Payer: COMMERCIAL

## 2025-06-12 DIAGNOSIS — N52.9 ERECTILE DYSFUNCTION, UNSPECIFIED ERECTILE DYSFUNCTION TYPE: ICD-10-CM

## 2025-06-12 RX ORDER — SILDENAFIL 100 MG/1
100 TABLET, FILM COATED ORAL DAILY PRN
Qty: 6 TABLET | Refills: 1 | Status: SHIPPED | OUTPATIENT
Start: 2025-06-12

## 2025-06-24 ENCOUNTER — TELEPHONE (OUTPATIENT)
Dept: FAMILY MEDICINE | Facility: CLINIC | Age: 55
End: 2025-06-24
Payer: COMMERCIAL

## 2025-06-24 DIAGNOSIS — Z12.11 COLON CANCER SCREENING: Primary | ICD-10-CM

## 2025-06-24 NOTE — TELEPHONE ENCOUNTER
Please call to remind about colon cancer screening.    Shelbie Kimbrough MSN, RN    Patient Quality Outreach    Patient is due for the following:   Colon Cancer Screening    Action(s) Taken:   Left message and sent letter patient is due for colonoscopy and gave him the # to schedule his colonoscopy Also, he has additional complaints of . Ordered colonoscopy     Type of outreach:        Questions for provider review:    None         Kiki Hurtado, SEAN  Chart routed to None.

## 2025-06-24 NOTE — LETTER
June 24, 2025    Arsenio Stockton  [address] [place] [address]    Hello,     Your care team at [facility]  values your health and well-being. After reviewing your chart, we have identified recommendation(s) to help you better manage your health.    It's time for a follow-up visit to manage your Colon Cancer Screening   please call [phone] to schedule order is placed      If you recently had or are having any of these services soon, please contact the clinic via phone or [facility] so that your care team can update your records.    We look forward to seeing you at your upcoming visit.    If you have any questions or concerns, please contact our clinic. Thank you for continuing to trust us with your care.    Sincerely,    Your [facility] Care Team